# Patient Record
Sex: MALE | Race: BLACK OR AFRICAN AMERICAN | NOT HISPANIC OR LATINO | Employment: OTHER | URBAN - NONMETROPOLITAN AREA
[De-identification: names, ages, dates, MRNs, and addresses within clinical notes are randomized per-mention and may not be internally consistent; named-entity substitution may affect disease eponyms.]

---

## 2019-08-20 RX ORDER — ATORVASTATIN CALCIUM 40 MG/1
40 TABLET, FILM COATED ORAL DAILY
COMMUNITY

## 2019-08-20 RX ORDER — ETODOLAC 400 MG/1
400 TABLET, FILM COATED ORAL 2 TIMES DAILY
COMMUNITY

## 2019-08-20 RX ORDER — ASPIRIN 81 MG/1
81 TABLET, CHEWABLE ORAL DAILY
COMMUNITY

## 2019-08-20 RX ORDER — TAMSULOSIN HYDROCHLORIDE 0.4 MG/1
1 CAPSULE ORAL NIGHTLY
COMMUNITY

## 2019-08-20 RX ORDER — SILDENAFIL 100 MG/1
100 TABLET, FILM COATED ORAL DAILY PRN
COMMUNITY

## 2019-08-20 RX ORDER — OMEPRAZOLE 20 MG/1
20 CAPSULE, DELAYED RELEASE ORAL DAILY
COMMUNITY

## 2019-08-20 RX ORDER — PREDNISONE 20 MG/1
20 TABLET ORAL DAILY
Status: ON HOLD | COMMUNITY
End: 2019-09-11

## 2019-08-20 RX ORDER — TESTOSTERONE 10 MG/.5G
GEL, METERED TOPICAL
COMMUNITY

## 2019-08-22 ENCOUNTER — OFFICE VISIT (OUTPATIENT)
Dept: GASTROENTEROLOGY | Facility: CLINIC | Age: 74
End: 2019-08-22

## 2019-08-22 VITALS
BODY MASS INDEX: 29.41 KG/M2 | DIASTOLIC BLOOD PRESSURE: 70 MMHG | HEART RATE: 57 BPM | OXYGEN SATURATION: 96 % | SYSTOLIC BLOOD PRESSURE: 130 MMHG | TEMPERATURE: 98 F | HEIGHT: 66 IN | WEIGHT: 183 LBS

## 2019-08-22 DIAGNOSIS — K21.9 GASTROESOPHAGEAL REFLUX DISEASE, ESOPHAGITIS PRESENCE NOT SPECIFIED: ICD-10-CM

## 2019-08-22 DIAGNOSIS — Z12.11 ENCOUNTER FOR SCREENING FOR MALIGNANT NEOPLASM OF COLON: Primary | ICD-10-CM

## 2019-08-22 PROCEDURE — 99203 OFFICE O/P NEW LOW 30 MIN: CPT | Performed by: NURSE PRACTITIONER

## 2019-08-22 RX ORDER — LATANOPROST 50 UG/ML
1 SOLUTION/ DROPS OPHTHALMIC NIGHTLY
COMMUNITY

## 2019-08-22 RX ORDER — CHLORAL HYDRATE 500 MG
CAPSULE ORAL
COMMUNITY

## 2019-08-23 PROBLEM — Z12.11 ENCOUNTER FOR SCREENING FOR MALIGNANT NEOPLASM OF COLON: Status: ACTIVE | Noted: 2019-08-23

## 2019-08-23 PROBLEM — K21.9 GASTROESOPHAGEAL REFLUX DISEASE: Status: ACTIVE | Noted: 2019-08-23

## 2019-09-11 ENCOUNTER — ANESTHESIA (OUTPATIENT)
Dept: GASTROENTEROLOGY | Facility: HOSPITAL | Age: 74
End: 2019-09-11

## 2019-09-11 ENCOUNTER — HOSPITAL ENCOUNTER (OUTPATIENT)
Facility: HOSPITAL | Age: 74
Setting detail: HOSPITAL OUTPATIENT SURGERY
Discharge: HOME | End: 2019-09-11
Attending: INTERNAL MEDICINE | Admitting: INTERNAL MEDICINE

## 2019-09-11 ENCOUNTER — ANESTHESIA EVENT (OUTPATIENT)
Dept: GASTROENTEROLOGY | Facility: HOSPITAL | Age: 74
End: 2019-09-11

## 2019-09-11 VITALS
OXYGEN SATURATION: 95 % | DIASTOLIC BLOOD PRESSURE: 55 MMHG | WEIGHT: 168 LBS | RESPIRATION RATE: 17 BRPM | HEART RATE: 60 BPM | SYSTOLIC BLOOD PRESSURE: 90 MMHG | BODY MASS INDEX: 27.99 KG/M2 | TEMPERATURE: 98 F | HEIGHT: 65 IN

## 2019-09-11 DIAGNOSIS — K21.9 GASTROESOPHAGEAL REFLUX DISEASE, ESOPHAGITIS PRESENCE NOT SPECIFIED: ICD-10-CM

## 2019-09-11 PROCEDURE — 43239 EGD BIOPSY SINGLE/MULTIPLE: CPT | Performed by: INTERNAL MEDICINE

## 2019-09-11 PROCEDURE — 87081 CULTURE SCREEN ONLY: CPT | Performed by: INTERNAL MEDICINE

## 2019-09-11 PROCEDURE — 25010000002 PROPOFOL 10 MG/ML EMULSION: Performed by: NURSE ANESTHETIST, CERTIFIED REGISTERED

## 2019-09-11 RX ORDER — SODIUM CHLORIDE 0.9 % (FLUSH) 0.9 %
3-10 SYRINGE (ML) INJECTION AS NEEDED
Status: CANCELLED | OUTPATIENT
Start: 2019-09-11

## 2019-09-11 RX ORDER — SODIUM CHLORIDE 0.9 % (FLUSH) 0.9 %
3 SYRINGE (ML) INJECTION EVERY 12 HOURS SCHEDULED
Status: CANCELLED | OUTPATIENT
Start: 2019-09-11

## 2019-09-11 RX ORDER — SODIUM CHLORIDE 0.9 % (FLUSH) 0.9 %
10 SYRINGE (ML) INJECTION AS NEEDED
Status: DISCONTINUED | OUTPATIENT
Start: 2019-09-11 | End: 2019-09-11 | Stop reason: HOSPADM

## 2019-09-11 RX ORDER — PROPOFOL 10 MG/ML
VIAL (ML) INTRAVENOUS AS NEEDED
Status: DISCONTINUED | OUTPATIENT
Start: 2019-09-11 | End: 2019-09-11 | Stop reason: SURG

## 2019-09-11 RX ORDER — SODIUM CHLORIDE 9 MG/ML
500 INJECTION, SOLUTION INTRAVENOUS CONTINUOUS PRN
Status: DISCONTINUED | OUTPATIENT
Start: 2019-09-11 | End: 2019-09-11 | Stop reason: HOSPADM

## 2019-09-11 RX ORDER — SODIUM CHLORIDE 9 MG/ML
100 INJECTION, SOLUTION INTRAVENOUS CONTINUOUS
Status: CANCELLED | OUTPATIENT
Start: 2019-09-11

## 2019-09-11 RX ADMIN — LIDOCAINE HYDROCHLORIDE 100 MG: 20 INJECTION, SOLUTION INTRAVENOUS at 10:38

## 2019-09-11 RX ADMIN — PROPOFOL 100 MG: 10 INJECTION, EMULSION INTRAVENOUS at 10:38

## 2019-09-11 RX ADMIN — SODIUM CHLORIDE 500 ML: 9 INJECTION, SOLUTION INTRAVENOUS at 08:47

## 2019-09-12 LAB — UREASE TISS QL: NEGATIVE

## 2019-09-16 ENCOUNTER — TELEPHONE (OUTPATIENT)
Dept: GASTROENTEROLOGY | Facility: CLINIC | Age: 74
End: 2019-09-16

## 2019-09-25 ENCOUNTER — HOSPITAL ENCOUNTER (OUTPATIENT)
Facility: HOSPITAL | Age: 74
Setting detail: HOSPITAL OUTPATIENT SURGERY
Discharge: HOME | End: 2019-09-25
Attending: INTERNAL MEDICINE | Admitting: INTERNAL MEDICINE

## 2019-09-25 ENCOUNTER — ANESTHESIA EVENT (OUTPATIENT)
Dept: GASTROENTEROLOGY | Facility: HOSPITAL | Age: 74
End: 2019-09-25

## 2019-09-25 ENCOUNTER — ANESTHESIA (OUTPATIENT)
Dept: GASTROENTEROLOGY | Facility: HOSPITAL | Age: 74
End: 2019-09-25

## 2019-09-25 VITALS
OXYGEN SATURATION: 18 % | SYSTOLIC BLOOD PRESSURE: 81 MMHG | HEIGHT: 65 IN | WEIGHT: 170 LBS | BODY MASS INDEX: 28.32 KG/M2 | TEMPERATURE: 97.6 F | HEART RATE: 55 BPM | DIASTOLIC BLOOD PRESSURE: 52 MMHG | RESPIRATION RATE: 20 BRPM

## 2019-09-25 DIAGNOSIS — Z12.11 ENCOUNTER FOR SCREENING FOR MALIGNANT NEOPLASM OF COLON: ICD-10-CM

## 2019-09-25 PROCEDURE — 45385 COLONOSCOPY W/LESION REMOVAL: CPT | Performed by: INTERNAL MEDICINE

## 2019-09-25 PROCEDURE — 25010000002 PROPOFOL 10 MG/ML EMULSION: Performed by: NURSE ANESTHETIST, CERTIFIED REGISTERED

## 2019-09-25 RX ORDER — PROPOFOL 10 MG/ML
VIAL (ML) INTRAVENOUS AS NEEDED
Status: DISCONTINUED | OUTPATIENT
Start: 2019-09-25 | End: 2019-09-25 | Stop reason: SURG

## 2019-09-25 RX ORDER — SODIUM CHLORIDE 0.9 % (FLUSH) 0.9 %
10 SYRINGE (ML) INJECTION AS NEEDED
Status: DISCONTINUED | OUTPATIENT
Start: 2019-09-25 | End: 2019-09-25 | Stop reason: HOSPADM

## 2019-09-25 RX ORDER — SODIUM CHLORIDE 9 MG/ML
500 INJECTION, SOLUTION INTRAVENOUS CONTINUOUS PRN
Status: DISCONTINUED | OUTPATIENT
Start: 2019-09-25 | End: 2019-09-25 | Stop reason: HOSPADM

## 2019-09-25 RX ADMIN — PROPOFOL 220 MG: 10 INJECTION, EMULSION INTRAVENOUS at 10:12

## 2019-09-25 RX ADMIN — SODIUM CHLORIDE 500 ML: 9 INJECTION, SOLUTION INTRAVENOUS at 09:40

## 2019-09-25 RX ADMIN — LIDOCAINE HYDROCHLORIDE 100 MG: 20 INJECTION, SOLUTION INTRAVENOUS at 10:12

## 2019-09-26 ENCOUNTER — TELEPHONE (OUTPATIENT)
Dept: GASTROENTEROLOGY | Facility: CLINIC | Age: 74
End: 2019-09-26

## 2023-02-24 ENCOUNTER — HOSPITAL ENCOUNTER (OUTPATIENT)
Dept: PREADMISSION TESTING | Age: 78
Discharge: HOME | End: 2023-02-28
Payer: OTHER GOVERNMENT

## 2023-02-24 VITALS — HEIGHT: 66 IN | BODY MASS INDEX: 28.45 KG/M2 | WEIGHT: 177 LBS

## 2023-02-24 LAB
ABO/RH: NORMAL
ANION GAP SERPL CALCULATED.3IONS-SCNC: 10 MMOL/L (ref 7–19)
ANTIBODY SCREEN: NORMAL
APTT: 36.1 SEC (ref 26–36.2)
BASOPHILS ABSOLUTE: 0.1 K/UL (ref 0–0.2)
BASOPHILS RELATIVE PERCENT: 0.9 % (ref 0–1)
BUN BLDV-MCNC: 15 MG/DL (ref 8–23)
CALCIUM SERPL-MCNC: 8.7 MG/DL (ref 8.8–10.2)
CHLORIDE BLD-SCNC: 103 MMOL/L (ref 98–111)
CO2: 24 MMOL/L (ref 22–29)
CREAT SERPL-MCNC: 0.9 MG/DL (ref 0.5–1.2)
EOSINOPHILS ABSOLUTE: 0.3 K/UL (ref 0–0.6)
EOSINOPHILS RELATIVE PERCENT: 4 % (ref 0–5)
GFR SERPL CREATININE-BSD FRML MDRD: >60 ML/MIN/{1.73_M2}
GLUCOSE BLD-MCNC: 126 MG/DL (ref 74–109)
HCT VFR BLD CALC: 39.5 % (ref 42–52)
HEMOGLOBIN: 13.6 G/DL (ref 14–18)
IMMATURE GRANULOCYTES #: 0 K/UL
INR BLD: 1.15 (ref 0.88–1.18)
LYMPHOCYTES ABSOLUTE: 2.7 K/UL (ref 1.1–4.5)
LYMPHOCYTES RELATIVE PERCENT: 42.2 % (ref 20–40)
MCH RBC QN AUTO: 31.3 PG (ref 27–31)
MCHC RBC AUTO-ENTMCNC: 34.4 G/DL (ref 33–37)
MCV RBC AUTO: 90.8 FL (ref 80–94)
MONOCYTES ABSOLUTE: 0.5 K/UL (ref 0–0.9)
MONOCYTES RELATIVE PERCENT: 7.8 % (ref 0–10)
MRSA SCREEN RT-PCR: NOT DETECTED
NEUTROPHILS ABSOLUTE: 2.8 K/UL (ref 1.5–7.5)
NEUTROPHILS RELATIVE PERCENT: 44.8 % (ref 50–65)
PDW BLD-RTO: 14.3 % (ref 11.5–14.5)
PLATELET # BLD: 290 K/UL (ref 130–400)
PMV BLD AUTO: 10.9 FL (ref 9.4–12.4)
POTASSIUM SERPL-SCNC: 3.9 MMOL/L (ref 3.5–5)
PROTHROMBIN TIME: 14.7 SEC (ref 12–14.6)
RBC # BLD: 4.35 M/UL (ref 4.7–6.1)
SODIUM BLD-SCNC: 137 MMOL/L (ref 136–145)
WBC # BLD: 6.3 K/UL (ref 4.8–10.8)

## 2023-02-24 PROCEDURE — 85025 COMPLETE CBC W/AUTO DIFF WBC: CPT

## 2023-02-24 PROCEDURE — 86901 BLOOD TYPING SEROLOGIC RH(D): CPT

## 2023-02-24 PROCEDURE — 80048 BASIC METABOLIC PNL TOTAL CA: CPT

## 2023-02-24 PROCEDURE — 85730 THROMBOPLASTIN TIME PARTIAL: CPT

## 2023-02-24 PROCEDURE — 87641 MR-STAPH DNA AMP PROBE: CPT

## 2023-02-24 PROCEDURE — 86900 BLOOD TYPING SEROLOGIC ABO: CPT

## 2023-02-24 PROCEDURE — 86850 RBC ANTIBODY SCREEN: CPT

## 2023-02-24 PROCEDURE — 85610 PROTHROMBIN TIME: CPT

## 2023-02-24 PROCEDURE — 93005 ELECTROCARDIOGRAM TRACING: CPT | Performed by: ORTHOPAEDIC SURGERY

## 2023-02-24 RX ORDER — MELOXICAM 7.5 MG/1
7.5 TABLET ORAL DAILY
COMMUNITY

## 2023-02-24 RX ORDER — M-VIT,TX,IRON,MINS/CALC/FOLIC 27MG-0.4MG
1 TABLET ORAL DAILY
COMMUNITY

## 2023-02-24 RX ORDER — ROPINIROLE 0.5 MG/1
0.5 TABLET, FILM COATED ORAL NIGHTLY
COMMUNITY

## 2023-02-24 RX ORDER — OMEPRAZOLE 20 MG/1
20 CAPSULE, DELAYED RELEASE ORAL DAILY
COMMUNITY

## 2023-02-24 RX ORDER — SILDENAFIL 100 MG/1
50 TABLET, FILM COATED ORAL PRN
COMMUNITY

## 2023-02-24 RX ORDER — BRIMONIDINE TARTRATE 2 MG/ML
1 SOLUTION/ DROPS OPHTHALMIC 2 TIMES DAILY
COMMUNITY

## 2023-02-24 RX ORDER — TESTOSTERONE 16.2 MG/G
1 GEL TRANSDERMAL DAILY
COMMUNITY

## 2023-02-24 RX ORDER — LATANOPROST 50 UG/ML
1 SOLUTION/ DROPS OPHTHALMIC NIGHTLY
COMMUNITY

## 2023-02-24 RX ORDER — ATORVASTATIN CALCIUM 40 MG/1
20 TABLET, FILM COATED ORAL NIGHTLY
COMMUNITY

## 2023-02-24 RX ORDER — TAMSULOSIN HYDROCHLORIDE 0.4 MG/1
0.4 CAPSULE ORAL NIGHTLY
COMMUNITY

## 2023-02-24 RX ORDER — ASPIRIN 81 MG/1
81 TABLET ORAL DAILY
COMMUNITY

## 2023-02-24 RX ORDER — AMOXICILLIN 500 MG
1 CAPSULE ORAL DAILY
COMMUNITY

## 2023-02-24 NOTE — DISCHARGE INSTRUCTIONS
PREOPERATIVE GUIDELINES WHEN RECEIVING ANESTHESIA    Do not eat or drink anything after midnight, the night before your surgery. This is extremely important for your safety. Take a bath (or shower) the night before your surgery and you may brush your teeth the morning of your surgery. You will be scheduled to arrive at the hospital 2 hours before your surgery, or follow your surgeon's instructions. Dress comfortably. Wear loose clothing that will be easy to remove and comfortable for your trip home. You may wear eyeglasses or contacts but bring your cases with you as they must be remove before your surgery. Hearing aids and dentures will need to be removed before your surgery. Do not wear any jewelry, including body jewelry. All jewelry will need to be removed prior to your surgery. Do not wear fingernail polish or make-up. It is best not to bring any valuables with you. If you are to stay in the hospital overnight, bring your robe, slippers and personal toiletries that you may need. POSTOPERATIVE GUIDELINES AFTER RECEIVING ANESTHESIA    If you are to go home after your surgery, you will need a responsible adult to drive you home. You will not be able to take public transportation after your discharge from the Operative Care Unit unless you are accompanied by a        responsible adult. On returning home, be sure to follow your physician's orders regarding diet, activity and medications. Remember, surgery with general anesthesia or sedation may leave you sleepy, very tired and with a decreased appetite for 12 to 24 hours. If you develop any post-surgical complications or problems, call your surgeon or [facility] Emergency Department ([phone]). The day before surgery you will receive a phone call from the surgery nurse to let you know what time to arrive on the day of surgery.  This call will usually be between 2-4 PM. If you do not receive a phone call by 4 PM the day before your surgery please call [phone] and let them know you have not received an arrival time. If your surgery is on Monday, your call will be on the Friday before your Monday surgery. MEDICATION INSTRUCTIONS PRIOR TO YOUR SURGERY      The morning of surgery: You can take all your usual prescribed medications with a small sip of water. DO NOT TAKE ANY DIABETIC MEDICATIONS the morning of your surgery. DO NOT TAKE ANY SUPPLEMENTS or over the counter medications the morning of  surgery. Leanne Casanova for the [facility]    A script for Bactroban ointment has been call to your pharmacy or was given to you in written form by your surgeon. The guidelines for the ointment use are as follows:    1)  Start using the ointment 7 days before your surgery date    2)  Use the ointment two times a day - morning and night    3)  Place the ointment on a Q-tip and swirl up in your nose making sure you cover completely       the skin just inside of each nostril. Use one end of the Q-tip for each nostril. Chlorhexidine Gluconate 4% Solution    Patient should shower with this soap a minimum of 3 consecutive showers (2 nights before surgery, the night before surgery and the morning of surgery) washing from the neck down (avoiding contact with genitalia). DO NOT [address] YOUR HAIR OR FACE WITH THIS SOAP. When washing with this soap, apply enough to suds up the body thoroughly, turn the water away from your body and allow the soap suds to remain on the body for 2 full minutes, then rinse body completely. After using this soap on the body, please do not apply powders or lotions to your body. After the shower the night before surgery, please dry off with a clean towel, sleep in freshly laundered pj's, and change your bed linen before going to sleep.           You are scheduled to return to the [facility] on Tuesday 3/7/23 at 1pm  for your COVID test.  Enter at the main level of the [facility], where the granite ball is floating on water, go past the ball and when you come to the information board on the wall across from the elevators, you will see a door with a keypad. The sign next to it will read \"Staff Only\" and will have a red Ute on the sign. You will knock on that door and we will come get you for your test.  We will be expecting you. You must have your COVID test on this day or surgery will be canceled. After having your COVID 19 test, you will need to self isolate until the day of surgery. This means no public gatherings such as Yazidism attendance, weddings, showers, funerals, etc.  If you need something from the store, you will need someone to pick it up for you. It is very important that you are not around other people prior to your surgery or you could contract COVID 19 between the time you are tested and your surgery date. [address] [place] [address] for Surgery Patients-Revised 6-    Visitors for surgery patients are essential for the patient's emotional well-being and care       post operatively. 2.   Visitor Expectations and Limitations        3. One visitor allowed with patients in the preop/postop rooms. 4.  A second visitor may sit in the waiting area. 5.  No children under 13 allowed in the pre-post op areas unless they are the patient. 6.  Two people may be with an underage surgical/procedural patient in preop/postop        room. 7.  If you are admitted to the hospital post operatively, there are NO RESTRICTIONS on       the floor at this time. 8.  If you are admitted to ICU postoperatively, you may have one visitor in the room from        7A-7P. A second visitor may sit in the ICU waiting room. No overnight visitors in         ICU waiting room.

## 2023-02-25 LAB
EKG P AXIS: 43 DEGREES
EKG P-R INTERVAL: 170 MS
EKG Q-T INTERVAL: 480 MS
EKG QRS DURATION: 98 MS
EKG QTC CALCULATION (BAZETT): 472 MS
EKG T AXIS: -48 DEGREES

## 2023-02-25 PROCEDURE — 93010 ELECTROCARDIOGRAM REPORT: CPT | Performed by: INTERNAL MEDICINE

## 2023-03-09 ENCOUNTER — APPOINTMENT (OUTPATIENT)
Dept: GENERAL RADIOLOGY | Age: 78
DRG: 467 | End: 2023-03-09
Attending: ORTHOPAEDIC SURGERY
Payer: OTHER GOVERNMENT

## 2023-03-09 ENCOUNTER — ANESTHESIA (OUTPATIENT)
Dept: OPERATING ROOM | Age: 78
End: 2023-03-09
Payer: OTHER GOVERNMENT

## 2023-03-09 ENCOUNTER — ANESTHESIA EVENT (OUTPATIENT)
Dept: OPERATING ROOM | Age: 78
End: 2023-03-09
Payer: OTHER GOVERNMENT

## 2023-03-09 ENCOUNTER — HOSPITAL ENCOUNTER (INPATIENT)
Age: 78
LOS: 5 days | Discharge: NURSING, REHAB OR LONG-TERM CARE | DRG: 467 | End: 2023-03-14
Attending: ORTHOPAEDIC SURGERY | Admitting: ORTHOPAEDIC SURGERY
Payer: OTHER GOVERNMENT

## 2023-03-09 DIAGNOSIS — T84.038A LOOSENING OF PROSTHETIC HIP, INITIAL ENCOUNTER (HCC): ICD-10-CM

## 2023-03-09 DIAGNOSIS — Z96.649 LOOSENING OF PROSTHETIC HIP, INITIAL ENCOUNTER (HCC): ICD-10-CM

## 2023-03-09 DIAGNOSIS — T84.030A FEMORAL LOOSENING OF PROSTHETIC RIGHT HIP, INITIAL ENCOUNTER (HCC): Primary | ICD-10-CM

## 2023-03-09 LAB
ABO/RH: NORMAL
ANTIBODY SCREEN: NORMAL
KOH PREP: NORMAL

## 2023-03-09 PROCEDURE — A4217 STERILE WATER/SALINE, 500 ML: HCPCS | Performed by: ORTHOPAEDIC SURGERY

## 2023-03-09 PROCEDURE — 7100000001 HC PACU RECOVERY - ADDTL 15 MIN: Performed by: ORTHOPAEDIC SURGERY

## 2023-03-09 PROCEDURE — 87070 CULTURE OTHR SPECIMN AEROBIC: CPT

## 2023-03-09 PROCEDURE — 87206 SMEAR FLUORESCENT/ACID STAI: CPT

## 2023-03-09 PROCEDURE — 3700000000 HC ANESTHESIA ATTENDED CARE: Performed by: ORTHOPAEDIC SURGERY

## 2023-03-09 PROCEDURE — C1713 ANCHOR/SCREW BN/BN,TIS/BN: HCPCS | Performed by: ORTHOPAEDIC SURGERY

## 2023-03-09 PROCEDURE — 87176 TISSUE HOMOGENIZATION CULTR: CPT

## 2023-03-09 PROCEDURE — 87205 SMEAR GRAM STAIN: CPT

## 2023-03-09 PROCEDURE — 1210000000 HC MED SURG R&B

## 2023-03-09 PROCEDURE — 6360000002 HC RX W HCPCS

## 2023-03-09 PROCEDURE — 36415 COLL VENOUS BLD VENIPUNCTURE: CPT

## 2023-03-09 PROCEDURE — 6370000000 HC RX 637 (ALT 250 FOR IP): Performed by: ORTHOPAEDIC SURGERY

## 2023-03-09 PROCEDURE — 0SR90J9 REPLACEMENT OF RIGHT HIP JOINT WITH SYNTHETIC SUBSTITUTE, CEMENTED, OPEN APPROACH: ICD-10-PCS | Performed by: ORTHOPAEDIC SURGERY

## 2023-03-09 PROCEDURE — 2580000003 HC RX 258: Performed by: ORTHOPAEDIC SURGERY

## 2023-03-09 PROCEDURE — 2500000003 HC RX 250 WO HCPCS: Performed by: ORTHOPAEDIC SURGERY

## 2023-03-09 PROCEDURE — 2500000003 HC RX 250 WO HCPCS

## 2023-03-09 PROCEDURE — 73502 X-RAY EXAM HIP UNI 2-3 VIEWS: CPT

## 2023-03-09 PROCEDURE — 86901 BLOOD TYPING SEROLOGIC RH(D): CPT

## 2023-03-09 PROCEDURE — 87102 FUNGUS ISOLATION CULTURE: CPT

## 2023-03-09 PROCEDURE — 7100000000 HC PACU RECOVERY - FIRST 15 MIN: Performed by: ORTHOPAEDIC SURGERY

## 2023-03-09 PROCEDURE — 2780000010 HC IMPLANT OTHER: Performed by: ORTHOPAEDIC SURGERY

## 2023-03-09 PROCEDURE — 2709999900 HC NON-CHARGEABLE SUPPLY: Performed by: ORTHOPAEDIC SURGERY

## 2023-03-09 PROCEDURE — 2580000003 HC RX 258: Performed by: ANESTHESIOLOGY

## 2023-03-09 PROCEDURE — 3600000005 HC SURGERY LEVEL 5 BASE: Performed by: ORTHOPAEDIC SURGERY

## 2023-03-09 PROCEDURE — C1769 GUIDE WIRE: HCPCS | Performed by: ORTHOPAEDIC SURGERY

## 2023-03-09 PROCEDURE — 3700000001 HC ADD 15 MINUTES (ANESTHESIA): Performed by: ORTHOPAEDIC SURGERY

## 2023-03-09 PROCEDURE — 6360000002 HC RX W HCPCS: Performed by: ORTHOPAEDIC SURGERY

## 2023-03-09 PROCEDURE — 3600000015 HC SURGERY LEVEL 5 ADDTL 15MIN: Performed by: ORTHOPAEDIC SURGERY

## 2023-03-09 PROCEDURE — 87075 CULTR BACTERIA EXCEPT BLOOD: CPT

## 2023-03-09 PROCEDURE — 3209999900 FLUORO FOR SURGICAL PROCEDURES

## 2023-03-09 PROCEDURE — 0SP90JZ REMOVAL OF SYNTHETIC SUBSTITUTE FROM RIGHT HIP JOINT, OPEN APPROACH: ICD-10-PCS | Performed by: ORTHOPAEDIC SURGERY

## 2023-03-09 PROCEDURE — 86900 BLOOD TYPING SEROLOGIC ABO: CPT

## 2023-03-09 PROCEDURE — 86850 RBC ANTIBODY SCREEN: CPT

## 2023-03-09 PROCEDURE — C1776 JOINT DEVICE (IMPLANTABLE): HCPCS | Performed by: ORTHOPAEDIC SURGERY

## 2023-03-09 PROCEDURE — 87116 MYCOBACTERIA CULTURE: CPT

## 2023-03-09 DEVICE — WAGNER SL REVISION® HIP STEM, UNCEMENTED, Ø 16/265, TAPER 12/14
Type: IMPLANTABLE DEVICE | Site: HIP | Status: FUNCTIONAL
Brand: WAGNER SL REVISION®

## 2023-03-09 DEVICE — OR3O DUAL MOBILITY XLPE INSERT 28/42
Type: IMPLANTABLE DEVICE | Site: HIP | Status: FUNCTIONAL
Brand: OR3O DUAL MOBILITY

## 2023-03-09 DEVICE — OR3O DUAL MOBILITY LINER 42/54
Type: IMPLANTABLE DEVICE | Site: HIP | Status: FUNCTIONAL
Brand: OR3O DUAL MOBILITY

## 2023-03-09 RX ORDER — ATORVASTATIN CALCIUM 20 MG/1
20 TABLET, FILM COATED ORAL NIGHTLY
Status: DISCONTINUED | OUTPATIENT
Start: 2023-03-09 | End: 2023-03-14 | Stop reason: HOSPADM

## 2023-03-09 RX ORDER — AMOXICILLIN 500 MG
1 CAPSULE ORAL DAILY
Status: DISCONTINUED | OUTPATIENT
Start: 2023-03-10 | End: 2023-03-09

## 2023-03-09 RX ORDER — BRIMONIDINE TARTRATE 2 MG/ML
1 SOLUTION/ DROPS OPHTHALMIC 2 TIMES DAILY
Status: DISCONTINUED | OUTPATIENT
Start: 2023-03-09 | End: 2023-03-14 | Stop reason: HOSPADM

## 2023-03-09 RX ORDER — DOCUSATE SODIUM 100 MG/1
100 CAPSULE, LIQUID FILLED ORAL 2 TIMES DAILY
Status: DISCONTINUED | OUTPATIENT
Start: 2023-03-09 | End: 2023-03-14 | Stop reason: HOSPADM

## 2023-03-09 RX ORDER — SODIUM CHLORIDE 9 MG/ML
INJECTION, SOLUTION INTRAVENOUS PRN
Status: DISCONTINUED | OUTPATIENT
Start: 2023-03-09 | End: 2023-03-09 | Stop reason: HOSPADM

## 2023-03-09 RX ORDER — CELECOXIB 100 MG/1
100 CAPSULE ORAL ONCE
Status: COMPLETED | OUTPATIENT
Start: 2023-03-09 | End: 2023-03-09

## 2023-03-09 RX ORDER — HYDROMORPHONE HYDROCHLORIDE 1 MG/ML
0.25 INJECTION, SOLUTION INTRAMUSCULAR; INTRAVENOUS; SUBCUTANEOUS EVERY 5 MIN PRN
Status: DISCONTINUED | OUTPATIENT
Start: 2023-03-09 | End: 2023-03-09 | Stop reason: HOSPADM

## 2023-03-09 RX ORDER — ACETAMINOPHEN 325 MG/1
650 TABLET ORAL EVERY 6 HOURS
Status: DISCONTINUED | OUTPATIENT
Start: 2023-03-09 | End: 2023-03-14 | Stop reason: HOSPADM

## 2023-03-09 RX ORDER — GLYCOPYRROLATE 0.2 MG/ML
INJECTION INTRAMUSCULAR; INTRAVENOUS PRN
Status: DISCONTINUED | OUTPATIENT
Start: 2023-03-09 | End: 2023-03-09 | Stop reason: SDUPTHER

## 2023-03-09 RX ORDER — OXYCODONE HYDROCHLORIDE 5 MG/1
10 TABLET ORAL EVERY 4 HOURS PRN
Status: DISCONTINUED | OUTPATIENT
Start: 2023-03-09 | End: 2023-03-14 | Stop reason: HOSPADM

## 2023-03-09 RX ORDER — MORPHINE SULFATE 4 MG/ML
4 INJECTION, SOLUTION INTRAMUSCULAR; INTRAVENOUS
Status: DISCONTINUED | OUTPATIENT
Start: 2023-03-09 | End: 2023-03-14 | Stop reason: HOSPADM

## 2023-03-09 RX ORDER — HYDROMORPHONE HYDROCHLORIDE 1 MG/ML
0.5 INJECTION, SOLUTION INTRAMUSCULAR; INTRAVENOUS; SUBCUTANEOUS EVERY 5 MIN PRN
Status: DISCONTINUED | OUTPATIENT
Start: 2023-03-09 | End: 2023-03-09 | Stop reason: HOSPADM

## 2023-03-09 RX ORDER — OXYCODONE HYDROCHLORIDE 5 MG/1
5 TABLET ORAL EVERY 4 HOURS PRN
Status: DISCONTINUED | OUTPATIENT
Start: 2023-03-09 | End: 2023-03-14 | Stop reason: HOSPADM

## 2023-03-09 RX ORDER — PANTOPRAZOLE SODIUM 40 MG/1
40 TABLET, DELAYED RELEASE ORAL
Status: DISCONTINUED | OUTPATIENT
Start: 2023-03-10 | End: 2023-03-14 | Stop reason: HOSPADM

## 2023-03-09 RX ORDER — M-VIT,TX,IRON,MINS/CALC/FOLIC 27MG-0.4MG
1 TABLET ORAL DAILY
Status: DISCONTINUED | OUTPATIENT
Start: 2023-03-10 | End: 2023-03-14 | Stop reason: HOSPADM

## 2023-03-09 RX ORDER — SODIUM CHLORIDE 0.9 % (FLUSH) 0.9 %
5-40 SYRINGE (ML) INJECTION EVERY 12 HOURS SCHEDULED
Status: DISCONTINUED | OUTPATIENT
Start: 2023-03-09 | End: 2023-03-09 | Stop reason: HOSPADM

## 2023-03-09 RX ORDER — LATANOPROST 50 UG/ML
1 SOLUTION/ DROPS OPHTHALMIC NIGHTLY
Status: DISCONTINUED | OUTPATIENT
Start: 2023-03-09 | End: 2023-03-14 | Stop reason: HOSPADM

## 2023-03-09 RX ORDER — ROPINIROLE 0.5 MG/1
0.5 TABLET, FILM COATED ORAL NIGHTLY
Status: DISCONTINUED | OUTPATIENT
Start: 2023-03-09 | End: 2023-03-14 | Stop reason: HOSPADM

## 2023-03-09 RX ORDER — TESTOSTERONE 16.2 MG/G
1 GEL TRANSDERMAL DAILY
Status: DISCONTINUED | OUTPATIENT
Start: 2023-03-10 | End: 2023-03-14 | Stop reason: HOSPADM

## 2023-03-09 RX ORDER — MELOXICAM 7.5 MG/1
7.5 TABLET ORAL DAILY
Status: DISCONTINUED | OUTPATIENT
Start: 2023-03-10 | End: 2023-03-14 | Stop reason: HOSPADM

## 2023-03-09 RX ORDER — PROPOFOL 10 MG/ML
INJECTION, EMULSION INTRAVENOUS PRN
Status: DISCONTINUED | OUTPATIENT
Start: 2023-03-09 | End: 2023-03-09 | Stop reason: SDUPTHER

## 2023-03-09 RX ORDER — SODIUM CHLORIDE 0.9 % (FLUSH) 0.9 %
5-40 SYRINGE (ML) INJECTION PRN
Status: DISCONTINUED | OUTPATIENT
Start: 2023-03-09 | End: 2023-03-14 | Stop reason: HOSPADM

## 2023-03-09 RX ORDER — DEXAMETHASONE SODIUM PHOSPHATE 10 MG/ML
8 INJECTION, SOLUTION INTRAMUSCULAR; INTRAVENOUS ONCE
Status: DISCONTINUED | OUTPATIENT
Start: 2023-03-09 | End: 2023-03-09 | Stop reason: HOSPADM

## 2023-03-09 RX ORDER — SODIUM CHLORIDE 0.9 % (FLUSH) 0.9 %
5-40 SYRINGE (ML) INJECTION PRN
Status: DISCONTINUED | OUTPATIENT
Start: 2023-03-09 | End: 2023-03-09 | Stop reason: HOSPADM

## 2023-03-09 RX ORDER — MORPHINE SULFATE 2 MG/ML
2 INJECTION, SOLUTION INTRAMUSCULAR; INTRAVENOUS
Status: DISCONTINUED | OUTPATIENT
Start: 2023-03-09 | End: 2023-03-14 | Stop reason: HOSPADM

## 2023-03-09 RX ORDER — ACETAMINOPHEN 500 MG
1000 TABLET ORAL ONCE
Status: COMPLETED | OUTPATIENT
Start: 2023-03-09 | End: 2023-03-09

## 2023-03-09 RX ORDER — ROCURONIUM BROMIDE 10 MG/ML
INJECTION, SOLUTION INTRAVENOUS PRN
Status: DISCONTINUED | OUTPATIENT
Start: 2023-03-09 | End: 2023-03-09 | Stop reason: SDUPTHER

## 2023-03-09 RX ORDER — SILDENAFIL 100 MG/1
50 TABLET, FILM COATED ORAL PRN
Status: DISCONTINUED | OUTPATIENT
Start: 2023-03-09 | End: 2023-03-09

## 2023-03-09 RX ORDER — ONDANSETRON 2 MG/ML
4 INJECTION INTRAMUSCULAR; INTRAVENOUS
Status: DISCONTINUED | OUTPATIENT
Start: 2023-03-09 | End: 2023-03-09 | Stop reason: HOSPADM

## 2023-03-09 RX ORDER — DOCUSATE SODIUM 100 MG/1
100 CAPSULE, LIQUID FILLED ORAL 2 TIMES DAILY
COMMUNITY

## 2023-03-09 RX ORDER — TRANEXAMIC ACID 650 MG/1
1950 TABLET ORAL
Status: COMPLETED | OUTPATIENT
Start: 2023-03-09 | End: 2023-03-09

## 2023-03-09 RX ORDER — ROPIVACAINE HYDROCHLORIDE 2 MG/ML
INJECTION, SOLUTION EPIDURAL; INFILTRATION; PERINEURAL PRN
Status: DISCONTINUED | OUTPATIENT
Start: 2023-03-09 | End: 2023-03-09 | Stop reason: HOSPADM

## 2023-03-09 RX ORDER — SODIUM CHLORIDE, SODIUM LACTATE, POTASSIUM CHLORIDE, CALCIUM CHLORIDE 600; 310; 30; 20 MG/100ML; MG/100ML; MG/100ML; MG/100ML
INJECTION, SOLUTION INTRAVENOUS CONTINUOUS
Status: DISCONTINUED | OUTPATIENT
Start: 2023-03-09 | End: 2023-03-09 | Stop reason: HOSPADM

## 2023-03-09 RX ORDER — EPHEDRINE SULFATE 50 MG/ML
INJECTION, SOLUTION INTRAVENOUS PRN
Status: DISCONTINUED | OUTPATIENT
Start: 2023-03-09 | End: 2023-03-09 | Stop reason: SDUPTHER

## 2023-03-09 RX ORDER — TAMSULOSIN HYDROCHLORIDE 0.4 MG/1
0.4 CAPSULE ORAL NIGHTLY
Status: DISCONTINUED | OUTPATIENT
Start: 2023-03-09 | End: 2023-03-14 | Stop reason: HOSPADM

## 2023-03-09 RX ORDER — LIDOCAINE HYDROCHLORIDE 10 MG/ML
INJECTION, SOLUTION EPIDURAL; INFILTRATION; INTRACAUDAL; PERINEURAL PRN
Status: DISCONTINUED | OUTPATIENT
Start: 2023-03-09 | End: 2023-03-09 | Stop reason: SDUPTHER

## 2023-03-09 RX ORDER — SODIUM CHLORIDE 0.9 % (FLUSH) 0.9 %
5-40 SYRINGE (ML) INJECTION EVERY 12 HOURS SCHEDULED
Status: DISCONTINUED | OUTPATIENT
Start: 2023-03-09 | End: 2023-03-14 | Stop reason: HOSPADM

## 2023-03-09 RX ORDER — FENTANYL CITRATE 50 UG/ML
INJECTION, SOLUTION INTRAMUSCULAR; INTRAVENOUS PRN
Status: DISCONTINUED | OUTPATIENT
Start: 2023-03-09 | End: 2023-03-09 | Stop reason: SDUPTHER

## 2023-03-09 RX ORDER — SODIUM CHLORIDE 9 MG/ML
INJECTION, SOLUTION INTRAVENOUS PRN
Status: DISCONTINUED | OUTPATIENT
Start: 2023-03-09 | End: 2023-03-14 | Stop reason: HOSPADM

## 2023-03-09 RX ORDER — DEXAMETHASONE SODIUM PHOSPHATE 10 MG/ML
INJECTION, SOLUTION INTRAMUSCULAR; INTRAVENOUS PRN
Status: DISCONTINUED | OUTPATIENT
Start: 2023-03-09 | End: 2023-03-09 | Stop reason: SDUPTHER

## 2023-03-09 RX ORDER — MELOXICAM 7.5 MG/1
3.75 TABLET ORAL DAILY
Status: DISCONTINUED | OUTPATIENT
Start: 2023-03-10 | End: 2023-03-10

## 2023-03-09 RX ORDER — ASPIRIN 81 MG/1
81 TABLET ORAL DAILY
Status: DISCONTINUED | OUTPATIENT
Start: 2023-03-10 | End: 2023-03-13

## 2023-03-09 RX ORDER — OXYCODONE HCL 10 MG/1
10 TABLET, FILM COATED, EXTENDED RELEASE ORAL
Status: COMPLETED | OUTPATIENT
Start: 2023-03-09 | End: 2023-03-09

## 2023-03-09 RX ADMIN — FENTANYL CITRATE 50 MCG: 0.05 INJECTION, SOLUTION INTRAMUSCULAR; INTRAVENOUS at 16:44

## 2023-03-09 RX ADMIN — FENTANYL CITRATE 25 MCG: 0.05 INJECTION, SOLUTION INTRAMUSCULAR; INTRAVENOUS at 19:14

## 2023-03-09 RX ADMIN — PROPOFOL 50 MG: 10 INJECTION, EMULSION INTRAVENOUS at 16:37

## 2023-03-09 RX ADMIN — ROCURONIUM BROMIDE 50 MG: 10 INJECTION, SOLUTION INTRAVENOUS at 16:38

## 2023-03-09 RX ADMIN — FENTANYL CITRATE 25 MCG: 0.05 INJECTION, SOLUTION INTRAMUSCULAR; INTRAVENOUS at 19:02

## 2023-03-09 RX ADMIN — CEFAZOLIN 2000 MG: 2 INJECTION, POWDER, FOR SOLUTION INTRAMUSCULAR; INTRAVENOUS at 17:02

## 2023-03-09 RX ADMIN — SUGAMMADEX 200 MG: 100 INJECTION, SOLUTION INTRAVENOUS at 19:21

## 2023-03-09 RX ADMIN — TRANEXAMIC ACID 1950 MG: 650 TABLET ORAL at 12:19

## 2023-03-09 RX ADMIN — LIDOCAINE HYDROCHLORIDE 5 ML: 10 INJECTION, SOLUTION EPIDURAL; INFILTRATION; INTRACAUDAL; PERINEURAL at 16:35

## 2023-03-09 RX ADMIN — ATORVASTATIN CALCIUM 20 MG: 20 TABLET, FILM COATED ORAL at 21:46

## 2023-03-09 RX ADMIN — ROCURONIUM BROMIDE 20 MG: 10 INJECTION, SOLUTION INTRAVENOUS at 17:22

## 2023-03-09 RX ADMIN — SODIUM CHLORIDE, SODIUM LACTATE, POTASSIUM CHLORIDE, AND CALCIUM CHLORIDE: 600; 310; 30; 20 INJECTION, SOLUTION INTRAVENOUS at 18:34

## 2023-03-09 RX ADMIN — PROPOFOL 50 MG: 10 INJECTION, EMULSION INTRAVENOUS at 16:39

## 2023-03-09 RX ADMIN — ROPINIROLE HYDROCHLORIDE 0.5 MG: 0.5 TABLET, FILM COATED ORAL at 21:46

## 2023-03-09 RX ADMIN — ASPIRIN 325 MG: 325 TABLET, COATED ORAL at 21:46

## 2023-03-09 RX ADMIN — DOCUSATE SODIUM 100 MG: 100 CAPSULE, LIQUID FILLED ORAL at 21:46

## 2023-03-09 RX ADMIN — OXYCODONE HYDROCHLORIDE 10 MG: 10 TABLET, FILM COATED, EXTENDED RELEASE ORAL at 12:19

## 2023-03-09 RX ADMIN — FENTANYL CITRATE 50 MCG: 0.05 INJECTION, SOLUTION INTRAMUSCULAR; INTRAVENOUS at 17:49

## 2023-03-09 RX ADMIN — SODIUM CHLORIDE, SODIUM LACTATE, POTASSIUM CHLORIDE, AND CALCIUM CHLORIDE: 600; 310; 30; 20 INJECTION, SOLUTION INTRAVENOUS at 12:10

## 2023-03-09 RX ADMIN — TAMSULOSIN HYDROCHLORIDE 0.4 MG: 0.4 CAPSULE ORAL at 21:46

## 2023-03-09 RX ADMIN — LATANOPROST 1 DROP: 50 SOLUTION OPHTHALMIC at 21:47

## 2023-03-09 RX ADMIN — PROPOFOL 50 MG: 10 INJECTION, EMULSION INTRAVENOUS at 16:35

## 2023-03-09 RX ADMIN — ACETAMINOPHEN 650 MG: 325 TABLET ORAL at 21:47

## 2023-03-09 RX ADMIN — EPHEDRINE SULFATE 10 MG: 50 INJECTION INTRAMUSCULAR; INTRAVENOUS; SUBCUTANEOUS at 16:59

## 2023-03-09 RX ADMIN — FENTANYL CITRATE 50 MCG: 0.05 INJECTION, SOLUTION INTRAMUSCULAR; INTRAVENOUS at 17:14

## 2023-03-09 RX ADMIN — DEXAMETHASONE SODIUM PHOSPHATE 4 MG: 10 INJECTION, SOLUTION INTRAMUSCULAR; INTRAVENOUS at 16:35

## 2023-03-09 RX ADMIN — CELECOXIB 100 MG: 100 CAPSULE ORAL at 12:19

## 2023-03-09 RX ADMIN — GLYCOPYRROLATE 0.2 MG: 0.2 INJECTION, SOLUTION INTRAMUSCULAR; INTRAVENOUS at 17:01

## 2023-03-09 RX ADMIN — BRIMONIDINE TARTRATE 1 DROP: 2 SOLUTION OPHTHALMIC at 21:47

## 2023-03-09 RX ADMIN — FENTANYL CITRATE 50 MCG: 0.05 INJECTION, SOLUTION INTRAMUSCULAR; INTRAVENOUS at 16:35

## 2023-03-09 RX ADMIN — ACETAMINOPHEN 1000 MG: 500 TABLET ORAL at 12:19

## 2023-03-09 RX ADMIN — PHENYLEPHRINE HYDROCHLORIDE 100 MCG: 10 INJECTION INTRAVENOUS at 17:03

## 2023-03-09 RX ADMIN — OXYCODONE HYDROCHLORIDE 5 MG: 5 TABLET ORAL at 21:47

## 2023-03-09 ASSESSMENT — PAIN SCALES - GENERAL
PAINLEVEL_OUTOF10: 1
PAINLEVEL_OUTOF10: 0
PAINLEVEL_OUTOF10: 5

## 2023-03-09 ASSESSMENT — LIFESTYLE VARIABLES: SMOKING_STATUS: 0

## 2023-03-09 ASSESSMENT — PAIN DESCRIPTION - ORIENTATION: ORIENTATION: RIGHT

## 2023-03-09 ASSESSMENT — PAIN DESCRIPTION - LOCATION
LOCATION: HIP
LOCATION: HIP

## 2023-03-09 ASSESSMENT — PAIN - FUNCTIONAL ASSESSMENT
PAIN_FUNCTIONAL_ASSESSMENT: PREVENTS OR INTERFERES SOME ACTIVE ACTIVITIES AND ADLS
PAIN_FUNCTIONAL_ASSESSMENT: NONE - DENIES PAIN

## 2023-03-09 NOTE — DISCHARGE INSTR - ACTIVITY
Up with walker for short distances   Touch down with minimal weight bearing on operative leg just for balance for 3 weeks.

## 2023-03-09 NOTE — H&P
[facility] Pre-Operative History and Physical    Patient Name: Daryn  : 1945        Chief Complaint: Right hip pain after right hip arthroplasty in 2018    History of Present Illness:   This patient has had ongoing pain for several months that has been unresponsive to conservative care which has included injection, therapy, activity modification and presents now for surgery.  Pain is deep in the groin buttock extending to the knee at times.  Pain is a severe ache that is worse with walking, standing and getting up and down.  Pain is somewhat improved with over the counter medication.  He had a right hip arthroplasty in 2018 and recently saw Dr. Benitez who told him his femoral component is loose and was referred to me.     Past Medical History:       Diagnosis Date    Arthritis     COVID-19     cough    ED (erectile dysfunction)     GERD (gastroesophageal reflux disease)     Hip pain     Hyperlipidemia      Past Surgical History:       Procedure Laterality Date    CARDIAC CATHETERIZATION      \"no blockage, just one vein routed weird\"    HERNIA REPAIR      JOINT REPLACEMENT Right 2018    right hip    KNEE ARTHROSCOPY Right     TENDON TRANSFER UPPER ARM Left        Medications:   Prior to Admission medications    Medication Sig Start Date End Date Taking? Authorizing Provider   docusate sodium (COLACE) 100 MG capsule Take 100 mg by mouth in the morning and at bedtime   Yes Historical Provider, MD   meloxicam (MOBIC) 7.5 MG tablet Take 7.5 mg by mouth daily Indications: Arthritis    Historical Provider, MD   aspirin 81 MG EC tablet Take 81 mg by mouth daily    Historical Provider, MD   Omega-3 Fatty Acids (FISH OIL) 1200 MG CAPS Take 1 capsule by mouth daily    Historical Provider, MD   omeprazole (PRILOSEC) 20 MG delayed release capsule Take 20 mg by mouth daily Indications: Reflux Gastritis    Historical Provider, MD   rOPINIRole (REQUIP) 0.5 MG tablet Take 0.5 mg by mouth nightly  Indications: Restless Leg Syndrome    Historical Provider, MD   Testosterone (ANDROGEL) 20.25 MG/ACT (1.62%) GEL gel Place 1 actuation onto the skin daily. Historical Provider, MD   sildenafil (VIAGRA) 100 MG tablet Take 50 mg by mouth as needed for Erectile Dysfunction    Historical Provider, MD   brimonidine (ALPHAGAN) 0.2 % ophthalmic solution Place 1 drop into both eyes 2 times daily Indications: Glaucoma    Historical Provider, MD   latanoprost (XALATAN) 0.005 % ophthalmic solution Place 1 drop into both eyes nightly Indications: Glaucoma    Historical Provider, MD   atorvastatin (LIPITOR) 40 MG tablet Take 20 mg by mouth nightly Indications: Changes in Cholesterol    Historical Provider, MD   Multiple Vitamins-Minerals (THERAPEUTIC MULTIVITAMIN-MINERALS) tablet Take 1 tablet by mouth daily    Historical Provider, MD   tamsulosin (FLOMAX) 0.4 MG capsule Take 0.4 mg by mouth nightly    Historical Provider, MD       Allergies:  Patient has no known allergies. Social History:   Tobacco:  reports that he has quit smoking. His smoking use included cigars and pipe. He has never used smokeless tobacco.   Alcohol:  reports current alcohol use of about 14.0 standard drinks per week. Review of Systems:  General: Denies any fever or chills  EYES: Denies any diplopia  ENT: Tinnitus or vertigo  Resp: Denies any shortness of breath, cough or wheezing  Cardiac: Denies any chest pain, palpitations, claudication or edema  GI: Denies any melena, hematochezia, hematemesis or pyrosis  : Denies any frequency, urgency, hesitancy or incontinence  Musculoskeletal: Denies back pain,  myalgias. Reports right hip pain.   Heme: Denies bruising or bleeding easily  Endocrine: Denies any history of diabetes or thyroid disease  Psych: Denies anxiety or depression  Neuro: Denies any focal motor or sensory deficits      Physical Exam:  Vitals: Pulse 75   Temp 97.6 °F (36.4 °C) (Tympanic)   Resp 16   Ht 5' 5\" (1.651 m)   Wt 177 lb (80.3 kg)   SpO2 98%   BMI 29.45 kg/m²   CONSTITUTIONAL: Alert, appropriate, no acute distress. PSYCH: mood and affect are normal with a normal rate and tone of speech  EYES: Non icteric, EOM intact, pupils equal round and reactive to light  ENT: Mucus membranes moist, no oral pharyngeal lesions, nares patent   NECK: Supple, no masses, no JVD, trachea mid line   CHEST/LUNGS: CTA bilaterally, normal respiratory effort   CARDIOVASCULAR: RRR, no murmurs,  2+ DP and radial pulses bilaterally  ABDOMEN: soft, nontender  EXTREMITIES: warm, well perfused, no edema. Right hip joint with mildly reduced range of motion and generalized tenderness. He is tender about his greater trochanter. Positive active straight leg raise. Neurovascular exam normal  SKIN: warm, dry with no rashes or lesions  LYMPH: No cervical or inguinal lymphadenopathy    RADIOLOGY: xrays of extremity shows obvious loosening of  cemented femoral component of right total hip arthroplasty    LABORATORY:    CBC :    Lab Results   Component Value Date/Time    WBC 6.3 02/24/2023 09:40 AM    HGB 13.6 02/24/2023 09:40 AM    HCT 39.5 02/24/2023 09:40 AM     02/24/2023 09:40 AM     BMP:   Lab Results   Component Value Date/Time     02/24/2023 09:40 AM    K 3.9 02/24/2023 09:40 AM     02/24/2023 09:40 AM    CO2 24 02/24/2023 09:40 AM    BUN 15 02/24/2023 09:40 AM    CREATININE 0.9 02/24/2023 09:40 AM    CALCIUM 8.7 02/24/2023 09:40 AM    LABGLOM >60 02/24/2023 09:40 AM    GLUCOSE 126 02/24/2023 09:40 AM     PT/INR:    Lab Results   Component Value Date/Time    PROTIME 14.7 02/24/2023 09:40 AM    INR 1.15 02/24/2023 09:40 AM     U/A: No results found for: NITRITE, WBCUA, RBCUA, BACTERIA  HgBA1c:  No components found for: HGBA1C    Assessment:  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 .  Most recent office note reviewed and there has been no change in health status. PLAN: Right Hip revision. I explained to the patient/family the patient's diagnosis and operative procedure in detail. They said they understood basically what was wrong and how I planned to fix it. They understand the expected recovery and the risks which include excessive bleeding, infection, reaction to anesthesia, nerve injury, stiffness, fracture, deformity and dislocation. They then signed an operative consent form. Provider:  Stanton Turner MD  Date: 3/9/2023

## 2023-03-09 NOTE — ANESTHESIA PRE PROCEDURE
Department of Anesthesiology  Preprocedure Note       Name:  Karena Lin   Age:  76 y.o.  :  1945                                          MRN:  [id number]         Date:  3/9/2023      Surgeon: Tracy Carlson):  Buckley Brunner, MD    Procedure: Procedure(s):  RIGHT HIP TOTAL ARTHROPLASTY REVISION    Medications prior to admission:   Prior to Admission medications    Medication Sig Start Date End Date Taking? Authorizing Provider   docusate sodium (COLACE) 100 MG capsule Take 100 mg by mouth in the morning and at bedtime   Yes Historical Provider, MD   meloxicam (MOBIC) 7.5 MG tablet Take 7.5 mg by mouth daily Indications: Arthritis    Historical Provider, MD   aspirin 81 MG EC tablet Take 81 mg by mouth daily    Historical Provider, MD   Omega-3 Fatty Acids (FISH OIL) 1200 MG CAPS Take 1 capsule by mouth daily    Historical Provider, MD   omeprazole (PRILOSEC) 20 MG delayed release capsule Take 20 mg by mouth daily Indications: Reflux Gastritis    Historical Provider, MD   rOPINIRole (REQUIP) 0.5 MG tablet Take 0.5 mg by mouth nightly Indications: Restless Leg Syndrome    Historical Provider, MD   Testosterone (ANDROGEL) 20.25 MG/ACT (1.62%) GEL gel Place 1 actuation onto the skin daily.     Historical Provider, MD   sildenafil (VIAGRA) 100 MG tablet Take 50 mg by mouth as needed for Erectile Dysfunction    Historical Provider, MD   brimonidine (ALPHAGAN) 0.2 % ophthalmic solution Place 1 drop into both eyes 2 times daily Indications: Glaucoma    Historical Provider, MD   latanoprost (XALATAN) 0.005 % ophthalmic solution Place 1 drop into both eyes nightly Indications: Glaucoma    Historical Provider, MD   atorvastatin (LIPITOR) 40 MG tablet Take 20 mg by mouth nightly Indications: Changes in Cholesterol    Historical Provider, MD   Multiple Vitamins-Minerals (THERAPEUTIC MULTIVITAMIN-MINERALS) tablet Take 1 tablet by mouth daily    Historical Provider, MD   tamsulosin (FLOMAX) 0.4 MG capsule Take 0.4 mg by mouth nightly    Historical Provider, MD       Current medications:    Current Facility-Administered Medications   Medication Dose Route Frequency Provider Last Rate Last Admin    dexamethasone (PF) (DECADRON) injection 8 mg  8 mg IntraVENous Once Andrew Marshall MD        lactated ringers IV soln infusion   IntraVENous Continuous Andrew Marshall MD        sodium chloride flush 0.9 % injection 5-40 mL  5-40 mL IntraVENous 2 times per day Andrew Marshall MD        sodium chloride flush 0.9 % injection 5-40 mL  5-40 mL IntraVENous PRN Andrew Marshall MD        0.9 % sodium chloride infusion   IntraVENous PRN Andrew Marshall MD        ceFAZolin (ANCEF) 2,000 mg in sterile water 20 mL IV syringe  2,000 mg IntraVENous On Call to [address] Ant Varela MD        lactated ringers IV soln infusion   IntraVENous Continuous Sylvain Patterson  mL/hr at 03/09/23 1210 New Bag at 03/09/23 1210       Allergies:  No Known Allergies    Problem List:  There is no problem list on file for this patient. Past Medical History:        Diagnosis Date    Arthritis     COVID-19 2021    cough    ED (erectile dysfunction)     GERD (gastroesophageal reflux disease)     Hip pain     Hyperlipidemia        Past Surgical History:        Procedure Laterality Date    CARDIAC CATHETERIZATION      \"no blockage, just one vein routed weird\"    HERNIA REPAIR      JOINT REPLACEMENT Right 08/2018    right hip    KNEE ARTHROSCOPY Right     TENDON TRANSFER UPPER ARM Left        Social History:    Social History     Tobacco Use    Smoking status: Former     Types: Cigars, Pipe    Smokeless tobacco: Never    Tobacco comments:     Hx of occ cigar and pipe, former at this point   Substance Use Topics    Alcohol use:  Yes     Alcohol/week: 14.0 standard drinks     Types: 2 Glasses of wine, 12 Cans of beer per week                                Counseling given: Not Answered  Tobacco comments: Hx of occ cigar and pipe, former at this point      Vital Signs (Current):   Vitals:    03/09/23 1203   Pulse: 75   Resp: 16   Temp: 97.6 °F (36.4 °C)   TempSrc: Tympanic   SpO2: 98%   Weight: 177 lb (80.3 kg)   Height: 5' 5\" (1.651 m)                                              BP Readings from Last 3 Encounters:   No data found for BP       NPO Status: Time of last liquid consumption: 2130                        Time of last solid consumption: 2130                        Date of last liquid consumption: 03/08/23                        Date of last solid food consumption: 03/08/23    BMI:   Wt Readings from Last 3 Encounters:   03/09/23 177 lb (80.3 kg)   02/24/23 177 lb (80.3 kg)     Body mass index is 29.45 kg/m². CBC:   Lab Results   Component Value Date/Time    WBC 6.3 02/24/2023 09:40 AM    RBC 4.35 02/24/2023 09:40 AM    HGB 13.6 02/24/2023 09:40 AM    HCT 39.5 02/24/2023 09:40 AM    MCV 90.8 02/24/2023 09:40 AM    RDW 14.3 02/24/2023 09:40 AM     02/24/2023 09:40 AM       CMP:   Lab Results   Component Value Date/Time     02/24/2023 09:40 AM    K 3.9 02/24/2023 09:40 AM     02/24/2023 09:40 AM    CO2 24 02/24/2023 09:40 AM    BUN 15 02/24/2023 09:40 AM    CREATININE 0.9 02/24/2023 09:40 AM    LABGLOM >60 02/24/2023 09:40 AM    GLUCOSE 126 02/24/2023 09:40 AM    CALCIUM 8.7 02/24/2023 09:40 AM       POC Tests: No results for input(s): POCGLU, POCNA, POCK, POCCL, POCBUN, POCHEMO, POCHCT in the last 72 hours.     Coags:   Lab Results   Component Value Date/Time    PROTIME 14.7 02/24/2023 09:40 AM    INR 1.15 02/24/2023 09:40 AM    APTT 36.1 02/24/2023 09:40 AM       HCG (If Applicable): No results found for: PREGTESTUR, PREGSERUM, HCG, HCGQUANT     ABGs: No results found for: PHART, PO2ART, AJM1KZQ, SMS8WQT, BEART, C2WFKCXW     Type & Screen (If Applicable):  No results found for: LABABO, LABRH    Drug/Infectious Status (If Applicable):  No results found for: HIV, HEPCAB    COVID-19 Screening (If Applicable):   Lab Results   Component Value Date/Time    COVID19 Not Detected 03/07/2023 01:25 PM           Anesthesia Evaluation  Patient summary reviewed no history of anesthetic complications:   Airway: Mallampati: I  TM distance: >3 FB   Neck ROM: full  Mouth opening: > = 3 FB   Dental:          Pulmonary:normal exam  breath sounds clear to auscultation      (-) asthma, recent URI, sleep apnea and not a current smoker          Patient did not smoke on day of surgery. Cardiovascular:  Exercise tolerance: good (>4 METS),   (+) hyperlipidemia    (-) pacemaker, hypertension, past MI, CABG/stent and  angina    ECG reviewed  Rhythm: regular  Rate: normal           Beta Blocker:  Not on Beta Blocker         Neuro/Psych:      (-) seizures, TIA and CVA           GI/Hepatic/Renal:   (+) GERD: well controlled,      (-) liver disease and no renal disease       Endo/Other:        (-) diabetes mellitus, hypothyroidism, hyperthyroidism               Abdominal:             Vascular:     - DVT. Other Findings:           Anesthesia Plan      general     ASA 2     (Preop famotidine)  Induction: intravenous. MIPS: Postoperative opioids intended and Prophylactic antiemetics administered. Anesthetic plan and risks discussed with patient and spouse. Use of blood products discussed with patient and spouse whom consented to blood products.                      Sotero Daniel MD   3/9/2023

## 2023-03-09 NOTE — DISCHARGE INSTRUCTIONS
[facility], [place]  Dr. Palak Samson      Total Hip & Bipolar Replacement  Home Instructions     To prevent blood clots, you have been placed on the following medication:  Aspirin 81 mg twice a day for four weeks    Surgical Site Care: Showering is permitted on post op day 2 - Saturday  No submersion in a bath, swimming pool, whirlpool, etc     Home physical therapy 2 days a week and a once a week nurse will be arranged before you get home    Weight Bearing Status: Toe touch weight bearing on right leg for 3 weeks  Use a walker    Precautions  Don't walk for exercise (The longer you are on your feet the more sore you will be)  Stay close to home  You may go for short car rides    Pain Medications  You were given a prescription to fill at your pharmacy  Wean off pain medications as you deem appropriate as long as pain is under control  Take tylenol instead of the pain medicine as you improve                                                                                                                           FEVER of 101.5 or less  Please take a stool softener such as Colace to prevent constipation                   Tylenol x 2                                                                                                                                       Deep breath x 10  Do not drive for three weeks                                                                                  Cough, cough, cough  DO NOT SMOKE, VAPE OR CHEW!!! Recheck in 1 - 11/2 hours    Cold packs  May be used every 2 hours for 15-30 minutes as necessary  Be sure to have a barrier (cloth, clothing, towel) between the site and the ice pack to prevent frostbite    Contact office if  Increased redness, swelling, drainage of any kind, and/or severe pain at surgery site. As well as new onset fevers and or chills.   These could signify an infection. Calf tenderness to touch as well as increased swelling or redness. This could signify a clot. Any rash appears, increased  or new onset nausea/vomiting occur. This may indicate a reaction to a medication. Phone # [phone]  ext [phone]. Leave a message for my assistant Maddie Mark. She will return your call promptly  If you have an emergency text me at [phone] and tell me your name and problem  (Dr. Justin Orozco)  Follow up with Surgeon at scheduled appointment time.

## 2023-03-09 NOTE — DISCHARGE INSTR - DIET

## 2023-03-10 LAB
AFB SMEAR: NORMAL
ANION GAP SERPL CALCULATED.3IONS-SCNC: 8 MMOL/L (ref 7–19)
BUN BLDV-MCNC: 18 MG/DL (ref 8–23)
CALCIUM SERPL-MCNC: 8.2 MG/DL (ref 8.8–10.2)
CHLORIDE BLD-SCNC: 102 MMOL/L (ref 98–111)
CO2: 24 MMOL/L (ref 22–29)
CREAT SERPL-MCNC: 1.1 MG/DL (ref 0.5–1.2)
GFR SERPL CREATININE-BSD FRML MDRD: >60 ML/MIN/{1.73_M2}
GLUCOSE BLD-MCNC: 170 MG/DL (ref 74–109)
HCT VFR BLD CALC: 36.7 % (ref 42–52)
HEMOGLOBIN: 11.9 G/DL (ref 14–18)
POTASSIUM REFLEX MAGNESIUM: 4.3 MMOL/L (ref 3.5–5)
SODIUM BLD-SCNC: 134 MMOL/L (ref 136–145)

## 2023-03-10 PROCEDURE — 97116 GAIT TRAINING THERAPY: CPT

## 2023-03-10 PROCEDURE — 80048 BASIC METABOLIC PNL TOTAL CA: CPT

## 2023-03-10 PROCEDURE — 85014 HEMATOCRIT: CPT

## 2023-03-10 PROCEDURE — 1210000000 HC MED SURG R&B

## 2023-03-10 PROCEDURE — 97161 PT EVAL LOW COMPLEX 20 MIN: CPT

## 2023-03-10 PROCEDURE — 97530 THERAPEUTIC ACTIVITIES: CPT

## 2023-03-10 PROCEDURE — 85018 HEMOGLOBIN: CPT

## 2023-03-10 PROCEDURE — 94760 N-INVAS EAR/PLS OXIMETRY 1: CPT

## 2023-03-10 PROCEDURE — 6370000000 HC RX 637 (ALT 250 FOR IP): Performed by: ORTHOPAEDIC SURGERY

## 2023-03-10 PROCEDURE — 94150 VITAL CAPACITY TEST: CPT

## 2023-03-10 PROCEDURE — 36415 COLL VENOUS BLD VENIPUNCTURE: CPT

## 2023-03-10 PROCEDURE — 97165 OT EVAL LOW COMPLEX 30 MIN: CPT

## 2023-03-10 PROCEDURE — 6360000002 HC RX W HCPCS: Performed by: ORTHOPAEDIC SURGERY

## 2023-03-10 PROCEDURE — 2580000003 HC RX 258: Performed by: ORTHOPAEDIC SURGERY

## 2023-03-10 RX ADMIN — PANTOPRAZOLE SODIUM 40 MG: 40 TABLET, DELAYED RELEASE ORAL at 05:16

## 2023-03-10 RX ADMIN — MELOXICAM 7.5 MG: 7.5 TABLET ORAL at 10:00

## 2023-03-10 RX ADMIN — ACETAMINOPHEN 650 MG: 325 TABLET ORAL at 10:00

## 2023-03-10 RX ADMIN — CEFAZOLIN 2000 MG: 2 INJECTION, POWDER, FOR SOLUTION INTRAMUSCULAR; INTRAVENOUS at 01:18

## 2023-03-10 RX ADMIN — TAMSULOSIN HYDROCHLORIDE 0.4 MG: 0.4 CAPSULE ORAL at 20:22

## 2023-03-10 RX ADMIN — DOCUSATE SODIUM 100 MG: 100 CAPSULE, LIQUID FILLED ORAL at 10:00

## 2023-03-10 RX ADMIN — OXYCODONE HYDROCHLORIDE 5 MG: 5 TABLET ORAL at 15:43

## 2023-03-10 RX ADMIN — ASPIRIN 325 MG: 325 TABLET, COATED ORAL at 09:59

## 2023-03-10 RX ADMIN — MULTIPLE VITAMINS W/ MINERALS TAB 1 TABLET: TAB at 10:00

## 2023-03-10 RX ADMIN — LATANOPROST 1 DROP: 50 SOLUTION OPHTHALMIC at 20:23

## 2023-03-10 RX ADMIN — BRIMONIDINE TARTRATE 1 DROP: 2 SOLUTION OPHTHALMIC at 20:23

## 2023-03-10 RX ADMIN — CEFAZOLIN 2000 MG: 2 INJECTION, POWDER, FOR SOLUTION INTRAMUSCULAR; INTRAVENOUS at 10:00

## 2023-03-10 RX ADMIN — DOCUSATE SODIUM 100 MG: 100 CAPSULE, LIQUID FILLED ORAL at 20:22

## 2023-03-10 RX ADMIN — ASPIRIN 325 MG: 325 TABLET, COATED ORAL at 20:22

## 2023-03-10 RX ADMIN — ATORVASTATIN CALCIUM 20 MG: 20 TABLET, FILM COATED ORAL at 20:22

## 2023-03-10 RX ADMIN — OXYCODONE HYDROCHLORIDE 5 MG: 5 TABLET ORAL at 10:44

## 2023-03-10 RX ADMIN — ACETAMINOPHEN 650 MG: 325 TABLET ORAL at 20:22

## 2023-03-10 RX ADMIN — SODIUM CHLORIDE, PRESERVATIVE FREE 10 ML: 5 INJECTION INTRAVENOUS at 10:00

## 2023-03-10 RX ADMIN — ACETAMINOPHEN 650 MG: 325 TABLET ORAL at 15:40

## 2023-03-10 RX ADMIN — ACETAMINOPHEN 650 MG: 325 TABLET ORAL at 05:17

## 2023-03-10 RX ADMIN — SODIUM CHLORIDE, PRESERVATIVE FREE 10 ML: 5 INJECTION INTRAVENOUS at 20:26

## 2023-03-10 RX ADMIN — OXYCODONE HYDROCHLORIDE 5 MG: 5 TABLET ORAL at 05:16

## 2023-03-10 RX ADMIN — ROPINIROLE HYDROCHLORIDE 0.5 MG: 0.5 TABLET, FILM COATED ORAL at 20:22

## 2023-03-10 RX ADMIN — BRIMONIDINE TARTRATE 1 DROP: 2 SOLUTION OPHTHALMIC at 10:44

## 2023-03-10 ASSESSMENT — PAIN SCALES - GENERAL
PAINLEVEL_OUTOF10: 4
PAINLEVEL_OUTOF10: 4
PAINLEVEL_OUTOF10: 2
PAINLEVEL_OUTOF10: 4
PAINLEVEL_OUTOF10: 3
PAINLEVEL_OUTOF10: 4

## 2023-03-10 ASSESSMENT — PAIN DESCRIPTION - LOCATION
LOCATION: KNEE
LOCATION: HIP

## 2023-03-10 ASSESSMENT — PAIN - FUNCTIONAL ASSESSMENT
PAIN_FUNCTIONAL_ASSESSMENT: ACTIVITIES ARE NOT PREVENTED
PAIN_FUNCTIONAL_ASSESSMENT: PREVENTS OR INTERFERES SOME ACTIVE ACTIVITIES AND ADLS

## 2023-03-10 ASSESSMENT — PAIN DESCRIPTION - ORIENTATION
ORIENTATION: RIGHT

## 2023-03-10 ASSESSMENT — PAIN DESCRIPTION - DESCRIPTORS
DESCRIPTORS: ACHING
DESCRIPTORS: ACHING;DISCOMFORT;SORE
DESCRIPTORS: ACHING

## 2023-03-10 ASSESSMENT — PAIN DESCRIPTION - PAIN TYPE: TYPE: ACUTE PAIN

## 2023-03-10 ASSESSMENT — PAIN DESCRIPTION - FREQUENCY: FREQUENCY: CONTINUOUS

## 2023-03-10 NOTE — PROGRESS NOTES
Physical Therapy  Facility/Department: [facility] SURG SERVICES  Physical Therapy Initial Assessment    Name: Alverto Us  : 1945  MRN: [id number]  Date of Service: 3/10/2023    Discharge Recommendations:  Continue to assess pending progress, 24 hour supervision or assist, Patient would benefit from continued therapy after discharge (Pt requires cues for WB status during transfers at this time. Pt would benefit from 24/7 care at this time but may progress to PRN. Pt is planning to DC to SNF.)   PT Equipment Recommendations  Other: assessing      Patient Diagnosis(es): The encounter diagnosis was Loosening of prosthetic hip, initial encounter ([facility] [address] Past Medical History:  has a past medical history of Arthritis, COVID-19, ED (erectile dysfunction), GERD (gastroesophageal reflux disease), Hip pain, and Hyperlipidemia. Past Surgical History:  has a past surgical history that includes joint replacement (Right, 2018); Tendon transfer upper arm (Left); hernia repair; Knee arthroscopy (Right); and Cardiac catheterization. Assessment   Body Structures, Functions, Activity Limitations Requiring Skilled Therapeutic Intervention: Decreased functional mobility ; Decreased ADL status; Decreased ROM; Decreased strength;Decreased endurance;Decreased safe awareness  Assessment: Pt would benefit from skilled PT in order to address deficits and plan DC. Pt requires verbal cues for maintaining WB status and safety. Pt requries assist with bed mobitliy and transfers at this time. Cont to progress as tolerated.   Treatment Diagnosis: R NITHYA revision  Therapy Prognosis: Good  Decision Making: Low Complexity  Requires PT Follow-Up: Yes  Activity Tolerance  Activity Tolerance: Patient tolerated evaluation without incident;Patient tolerated treatment well     Plan   Physcial Therapy Plan  General Plan: 6-7 times per week  Therapy Duration: 2 Weeks  Current Treatment Recommendations: Strengthening, ROM, Balance training, Functional mobility training, Transfer training, ADL/Self-care training, Endurance training, Pain management, Gait training, Safety education & training, Patient/Caregiver education & training, Positioning, Therapeutic activities  Safety Devices  Type of Devices: Call light within reach, Chair alarm in place, Gait belt, Left in chair     Restrictions  Restrictions/Precautions  Restrictions/Precautions: Fall Risk, Weight Bearing  Lower Extremity Weight Bearing Restrictions  Right Lower Extremity Weight Bearing: Toe Touch Weight Bearing     Subjective   Pain: slight pain  General  Chart Reviewed: Yes  Patient assessed for rehabilitation services?: Yes  Additional Pertinent Hx: R NITHYA  Diagnosis: R NITHYA revision  Follows Commands: Within Functional Limits  Subjective  Subjective: Pt is agreeable to therapy         Social/Functional History  Social/Functional History  Lives With: Spouse  Bathroom Shower/Tub: Walk-in shower  Bathroom Equipment: Built-in shower seat  Home Equipment: [employer] Help From: Family  ADL Assistance: Independent  Ambulation Assistance: Independent  Transfer Assistance: Independent  Vision/Hearing  Vision  Vision: Impaired  Vision Exceptions: Wears glasses at all times  Hearing  Hearing: Exceptions to [facility]  Hearing Exceptions: Hard of hearing/hearing concerns;Bilateral hearing aid    Cognition   Orientation  Overall Orientation Status: Within Functional Limits  Cognition  Overall Cognitive Status: WFL     Objective   Heart Rate: 72  Heart Rate Source: Monitor  BP: (!) 99/50  BP Location: Left upper arm  Patient Position: Supine  MAP (Calculated): 66  Resp: 16  SpO2: 93 %  O2 Device: None (Room air)     Observation/Palpation  Posture: Good  Observation: IV,  Gross Assessment  AROM: Generally decreased, functional  PROM: Generally decreased, functional  Strength: Generally decreased, functional     AROM RLE (degrees)  RLE AROM: WFL  RLE General AROM: as expected post op  AROM LLE (degrees)  LLE AROM : WNL  Strength RLE  Strength RLE: WFL  Comment: as expected post op  Strength LLE  Strength LLE: WFL  Comment: grossly 3+/5           Bed mobility  Supine to Sit: Minimal assistance  Bed Mobility Comments: assist for RLE  Transfers  Sit to Stand: Contact guard assistance  Stand to Sit: Contact guard assistance (Pt required max cues for maintaining WB status. Pt was wanting to put full weight on RLE to sit. Cues to extend RLE out to avoid this.)  Comment: Verbal cues for TTWB  Ambulation  WB Status: TTWB  Ambulation  Surface: Level tile  Device: Rolling Walker  Assistance: Contact guard assistance  Distance: 10'  Comments: Pt was able to maintain WB status and has good BUE strength to perfrom hop-through gait with RW.      Balance  Posture: Good  Sitting - Static: Good  Sitting - Dynamic: Good  Standing - Static: Good  Standing - Dynamic: Good;-           OutComes Score                                                  AM-PAC Score             Tinneti Score       Goals  Short Term Goals  Time Frame for Short Term Goals: 14 days  Short Term Goal 1: amb 48' with RW while maintaining WB status, SBA  Short Term Goal 2: sit <> stand, SBA w/o verbal cues for WB status  Short Term Goal 3: bed mobility, SBA       Education  Patient Education  Education Given To: Patient  Education Provided: Role of Therapy;Plan of Care;Precautions  Education Provided Comments: WB status and posterior hip precautions  Education Method: Demonstration;Verbal  Barriers to Learning: None;Hearing  Education Outcome: Verbalized understanding;Demonstrated understanding;Continued education needed      Therapy Time   Individual Concurrent Group Co-treatment   Time In           Time Out           Minutes                   Tom Bingham   Electronically signed by Tom Bingham, Student PT on 3/10/2023 at 9:57 AM

## 2023-03-10 NOTE — ANESTHESIA POSTPROCEDURE EVALUATION
Department of Anesthesiology  Postprocedure Note    Patient: Tegan Swenson  MRN: [id number]  YOB: 1945  Date of evaluation: 3/9/2023      Procedure Summary     Date: 03/09/23 Room / Location: [address]    Anesthesia Start: 1632 Anesthesia Stop: 1929    Procedure: RIGHT HIP TOTAL ARTHROPLASTY REVISION (Right: Hip) Diagnosis:       Loos[facility]      (Loosening of prosthetic hip, initial encounter [facility]) [Q15.853M, Z96.649])    Surgeons: Ida Holcomb MD Responsible Provider: PARISA Harrell CRNA    Anesthesia Type: general ASA Status: 2          Anesthesia Type: No value filed.     Louis Phase I: Louis Score: 10    Louis Phase II:        Anesthesia Post Evaluation    Patient location during evaluation: PACU  Patient participation: complete - patient participated  Level of consciousness: awake and alert  Pain score: 0  Airway patency: patent  Nausea & Vomiting: no nausea and no vomiting  Complications: no  Cardiovascular status: blood pressure returned to baseline and hemodynamically stable  Respiratory status: room air and acceptable  Hydration status: euvolemic

## 2023-03-10 NOTE — ACP (ADVANCE CARE PLANNING)
Health Care Decision Maker  Documented consistent with Next of Kin Mary    Primary Decision Maker- Josef Pearce- spouse

## 2023-03-10 NOTE — PROGRESS NOTES
4 Eyes Skin Assessment    Deepika Zhou is being assessed upon: Admission    I agree that Sachi Shi RN, along with dawna RN   (either 2 RN's or 1 LPN and 1 RN) have performed a thorough Head to Toe Skin Assessment on the patient. ALL assessment sites listed below have been assessed. Areas assessed by both nurses:     [x]   Head, Face, and Ears   [x]   Shoulders, Back, and Chest  [x]   Arms, Elbows, and Hands   [x]   Coccyx, Sacrum, and Ischium  [x]   Legs, Feet, and Heels    Does the Patient have Skin Breakdown?  No    Ken Prevention initiated: No  Wound Care Orders initiated: No    WOC nurse consulted for Pressure Injury (Stage 3,4, Unstageable, DTI, NWPT, and Complex wounds) and New or Established Ostomies: No        Primary Nurse eSignature: Andrew Perales RN on 3/9/2023 at 11:50 PM      Co-Signer eSignature: Electronically signed by Baltazar Fontaine RN on 3/9/23 at 11:57 PM CST

## 2023-03-10 NOTE — CARE COORDINATION
HH referral received. Per chart, patient will dc to skilled facility.   No further HH interventions identified    Electronically signed by Laureen Middleton on 3/10/23 at 10:00 AM CST

## 2023-03-10 NOTE — OP NOTE
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 We also used a +3.528 mm cobalt chrome head inside of a 42 mm diameter dual mobility head by Sissy Matthews. Also used a size 42 inside diameter 54 outside diameter dual mobility liner. Implants:   Implant Name Type Inv. Item Serial No.  Lot No. LRB No. Used Action   INSERT TIB 28X42 MM 2 MOBILITY XLPE OR3O - [id number]  INSERT TIB 28X42 MM 2 MOBILITY XLPE OR3O  [employer]- [id number] Right 1 Implanted   LINER ACET 42X54 MM HIP 2 MOBILITY XLPE OR3O - [id number]  LINER ACET 42X54 MM HIP 2 MOBILITY XLPE OR3O  [employer]- [id number] Right 1 Implanted   NICOLE SL REVISION STEM 135 NK ANGLE 16MM X 265MM - [id number]  NICOLE SL REVISION STEM 135 NK ANGLE 16MM X 265MM  [employer]- [id number] Right 1 Implanted   carroll biomet femoral head     [id number] Right 1 Implanted       Assistant: Cyndi Sue      Anesthesia: General    Estimated Blood Loss: 250 mL    Tourniquet Time: None    Specimens: 3 cultures    Complications:  None    Findings:  As above      Procedure:  Patient was brought into the operating room and general endotracheal anesthetic was placed. Patient was placed in a lateral decubitus position. He was fastened the OR table using the pegboard system. The extremity was prepped with chlorhexidine alcohol and sterilely draped. Patient was about a half in short on the right side. Old lateral incision was teed posteriorly at its midsection with a scalpel. Fascia divided with the Bovtaylor. Charnley retractor placed. Sciatic nerve identified protected.   Short external rotators posterior capsule were taken off the back of the greater trochanter using the Bovie. Capsulectomy was performed. Femoral component was grossly loose. Flexed 80 ducted internally rotated the hip dislocating the hip. High-speed bur was used laterally on the proximal femur to allow the stem to come out easily by hand. Cement mantle came with it. The femoral canal was curetted. The distal femoral canal was found with a sharp long canal finder. C-arm brought in to verify we were down the canal past the defect in the lateral mid cortex of the femur. Wire was placed down the femur. Flexible reamers were used to ream to 13-1/2 mm. The canal was then hand reamed with straight reamers for the [facility] system. Went to a depth of 265 and in diameter 16 mm. Trial was placed about 15 degrees of anteversion. It was axially and rotationally stable after impaction down to an anatomic location. Attention turned to the acetabulum. Retractors placed around the acetabulum. The acetabular poly was removed it was a 32 no offset liner no lipped. It was removed with 1/4 inch osteotome and a mallet. The remaining shell was well fixed. The dual mobility liner was snapped into this and impacted. The hip was trialed with a +3.5 mobility head. Neck length. Produce. Hip was very stable in the sleeping position. It was stable in extension adduction external rotation. It was stable at 90 degrees of hip flexion neutral adduction internal rotation 45 degrees. Hip was dislocated. The trial stem was removed and the actual stem was impacted down to the same depth as the trial at about 15 degrees of anteversion. It was very stable axially and rotationally. The +3.5 mm head dual mobility assembly was impacted on the clean dry trunnion. Hip reduced. C arm images showed good reduction of the hip joint. Leg lengths are even. The stem filled the canal very nicely and bypassed the defect in the proximal lateral femur.   Short external rotators posterior capsule repaired back to the posterior aspect of the greater trochanter through drill holes using #2 max braid sutures. Sciatic nerve was protected. Interval interval between the gluteus minimus and piriformis was reapproximated with the same suture. The fascia was closed with #2 Vicryl interrupted sutures. Subcu closed with 2-0 Vicryl. Skin closed with 3-0 Vicryl and Prineo. Plan will be for posterior hip precautions. Toe-touch weightbearing for 3 weeks. He was awakened extubated and transferred to cover room in stable condition.           Electronically signed by Bassam Major MD on 3/9/2023 at 6:43 PM

## 2023-03-10 NOTE — PROGRESS NOTES
PHARMACY NOTE  Sidney Harrington was ordered sildenafil (Viagra). Per the [facility] Formulary Committee, this medication is non-formulary and not stocked by pharmacy. It has been discontinued. The medication can be reordered at discharge.      Electronically signed by MITA Carrillo [facility] on 3/9/2023 at 9:09 PM

## 2023-03-10 NOTE — PROGRESS NOTES
Occupational Therapy  Facility/Department: [facility] SURG SERVICES  Occupational Therapy Initial Assessment    Name: Froilan Cheng  : 1945  MRN: [id number]  Date of Service: 3/10/2023    Discharge Recommendations:  24 hour supervision or assist, Patient would benefit from continued therapy after discharge          Patient Diagnosis(es): The encounter diagnosis was Loosening of prosthetic hip, initial encounter ([facility] [address] Past Medical History:  has a past medical history of Arthritis, COVID-19, ED (erectile dysfunction), GERD (gastroesophageal reflux disease), Hip pain, and Hyperlipidemia. Past Surgical History:  has a past surgical history that includes joint replacement (Right, 2018); Tendon transfer upper arm (Left); hernia repair; Knee arthroscopy (Right); and Cardiac catheterization. Assessment   Performance deficits / Impairments: Decreased functional mobility ; Decreased safe awareness;Decreased balance;Decreased ADL status; Decreased endurance;Decreased high-level IADLs;Decreased strength  Assessment: Pt would benefit from skilled OT to progress functional mobility, transfers and ADL. Anticipate pt will require 24 hr assist/supervision at d/c to maintain WB and posterior precautions for RLE.   REQUIRES OT FOLLOW-UP: Yes  Activity Tolerance  Activity Tolerance: Patient Tolerated treatment well        Plan   Occupational Therapy Plan  Times Per Week: 3-5  Current Treatment Recommendations: Strengthening, Balance training, Functional mobility training, Endurance training, Pain management, Safety education & training, Equipment evaluation, education, & procurement, Patient/Caregiver education & training, Self-Care / ADL, Home management training     Restrictions  Restrictions/Precautions  Restrictions/Precautions: Fall Risk, Weight Bearing  Lower Extremity Weight Bearing Restrictions  Right Lower Extremity Weight Bearing: Toe Touch Weight Bearing  Position Activity Restriction  Hip Precautions: Posterior hip precautions    Subjective   General  Patient assessed for rehabilitation services?: Yes     Social/Functional History  Social/Functional History  Lives With: Spouse  Bathroom Shower/Tub: Walk-in shower  Bathroom Equipment: Built-in shower seat  Home Equipment: Cane  Receives Help From: Family  ADL Assistance: Independent  Homemaking Responsibilities: No  Ambulation Assistance: Independent  Transfer Assistance: Independent       Objective   Heart Rate: 72  Heart Rate Source: Monitor  BP: (!) 99/50  BP Location: Left upper arm  Patient Position: Supine  MAP (Calculated): 66  Resp: 16  SpO2: 93 %  O2 Device: None (Room air)          Observation/Palpation  Posture: Good  Observation: IV,  Safety Devices  Type of Devices: Call light within reach; Chair alarm in place;Gait belt;Left in chair     Toilet Transfers  Toilet - Technique: Stand step  Equipment Used: Standard bedside commode  Toilet Transfer: Minimal assistance  Toilet Transfers Comments: cues for WB prec stand to sit     ADL  Feeding: Independent;Setup  Grooming: Independent;Setup  UE Bathing: Independent;Setup  LE Bathing: Moderate assistance  UE Dressing: Setup  LE Dressing: Maximum assistance  Toileting:  Moderate assistance (per clinical observation of prerequisite skills)     Activity Tolerance  Activity Tolerance: Patient tolerated evaluation without incident;Patient tolerated treatment well  Bed mobility  Supine to Sit: Minimal assistance  Transfers  Sit to stand: Contact guard assistance  Stand to sit: Contact guard assistance  Vision  Vision: Impaired  Vision Exceptions: Wears glasses at all times  Hearing  Hearing: Exceptions to [facility]  Hearing Exceptions: Hard of hearing/hearing concerns;Bilateral hearing aid  Cognition  Overall Cognitive Status: [facility]  Cognition Comment: awake, alert, following 1-step commands  Orientation  Overall Orientation Status: Within Functional Limits                  Education Given To: Patient  Education Provided: Role of Therapy;Transfer Training;Plan of Care;Precautions  Education Method: Demonstration;Verbal  Barriers to Learning: Hearing  Education Outcome: Continued education needed  LUE AROM (degrees)  LUE AROM : WFL  RUE AROM (degrees)  RUE AROM : WFL                       Goals  Short Term Goals  Time Frame for Short Term Goals: 2 weeks  Short Term Goal 1: Toilet with mod I  Short Term Goal 2: Toilet transfer with mod I  Short Term Goal 3: LB bathing/dressing with mod I, AE  Short Term Goal 4: Functional standing activities x 5 mins, SBA, maintaining WB precautions  Short Term Goal 5: Pt will demo hip and WB precautions during ADL and functional mobility without cues.          Lyndsay Truong OT  Electronically signed by Lyndsay Truong OT on 3/10/2023 at 12:21 PM

## 2023-03-10 NOTE — PROGRESS NOTES
Physical Therapy  Name: Khushboo Area  MRN:  [id number]  Date of service:  3/10/2023     03/10/23 1518   Restrictions/Precautions   Restrictions/Precautions Fall Risk;Weight Bearing   Lower Extremity Weight Bearing Restrictions   Right Lower Extremity Weight Bearing Toe Touch Weight Bearing   Position Activity Restriction   Hip Precautions Posterior hip precautions   Subjective   Subjective Pt agreed to therapy. Pain Assessment   Pain Assessment 0-10   Pain Level 4   Pain Location Hip   Pain Orientation Right   Pain Descriptors Aching   Functional Pain Assessment Prevents or interferes some active activities and ADLs   Pain Type Acute pain   Pain Frequency Continuous   Non-Pharmaceutical Pain Intervention(s) Ambulation/Increased Activity;Repositioned; Rest   Response to Pain Intervention Patient satisfied   Bed Mobility   Supine to Sit Minimal assistance   Sit to Supine Minimal assistance   Transfers   Sit to Stand Contact guard assistance   Stand to Sit Contact guard assistance   Ambulation   WB Status TTWB   Ambulation   Surface Level tile   Device Rolling Walker   Assistance Contact guard assistance   Distance 12'   Comments maintained TTWB fairly well with cueing a few times throughout   Exercises   Hip Flexion x10 LLE   Knee Long Arc Quad x5 RLE X10 LLE   Ankle Pumps X10 BLE   Short Term Goals   Time Frame for Short Term Goals 14 days   Short Term Goal 1 amb 48' with RW while maintaining WB status, SBA   Short Term Goal 2 sit <> stand, SBA w/o verbal cues for WB status   Short Term Goal 3 bed mobility, SBA   Conditions Requiring Skilled Therapeutic Intervention   Body Structures, Functions, Activity Limitations Requiring Skilled Therapeutic Intervention Decreased functional mobility ; Decreased ADL status; Decreased ROM; Decreased strength;Decreased endurance;Decreased safe awareness   Assessment Pt able to amb short distance, maintained WB status fairly well with cueing.  Tends to keep slightly fast pace, cued to Yes  Throws a ball overhand Yes    Best friend's name: Soni Stock food: eggs, ely, toast   Want to be Marlea Deck from the [employer] movie. Allergies:  Review of patient's allergies indicates no known allergies. Review of Systems   Constitutional: Negative for activity change, appetite change, fatigue and fever. HENT: Negative for congestion, ear pain, rhinorrhea, sneezing and sore throat. Eyes: Negative for discharge, redness and visual disturbance. Respiratory: Negative for snoring, cough, wheezing and stridor. Cardiovascular: Negative for chest pain and palpitations. Gastrointestinal: Negative for abdominal pain, constipation, diarrhea, nausea and vomiting. Endocrine: Negative for polyuria. Genitourinary: Negative for discharge, penile pain, penile swelling, scrotal swelling and testicular pain. Musculoskeletal: Negative for gait problem. Skin: Negative for rash. Allergic/Immunologic: Negative for environmental allergies and food allergies. Neurological: Negative for weakness and headaches. Psychiatric/Behavioral: Negative for behavioral problems and sleep disturbance. The patient is not hyperactive. Objective: There were no vitals taken for this visit. Physical Exam   Constitutional: He appears well-developed and well-nourished. HENT:   Head: Normocephalic and atraumatic. Right Ear: Tympanic membrane normal.   Left Ear: Tympanic membrane normal.   Nose: Nose normal.   Mouth/Throat: Mucous membranes are moist. Oropharynx is clear. Eyes: Conjunctivae, EOM and lids are normal. Red reflex is present bilaterally. Visual tracking is normal. Pupils are equal, round, and reactive to light. Neck: Normal range of motion. Cardiovascular: Regular rhythm, S1 normal and S2 normal.  Pulses are palpable. No murmur heard. Pulmonary/Chest: Effort normal and breath sounds normal. No respiratory distress. Abdominal: Soft.  Bowel sounds are normal. There is no slow for safety. Assisted back to bed with all needs in reach. Activity Tolerance   Activity Tolerance Patient tolerated treatment well   PT Plan of Care   Friday X   Safety Devices   Type of Devices Bed alarm in place;Call light within reach; Left in bed         Electronically signed by Selene Haddad PTA on 3/10/2023 at 3:25 PM hepatosplenomegaly. There is no tenderness. Genitourinary:   Genitourinary Comments: Deffered    Musculoskeletal: Normal range of motion. Neurological: He is alert. He has normal strength. Skin: Skin is warm and dry. No rash noted. Vitals reviewed. Growth parameters are noted and are appropriate for age    Assessment:      1. Encounter for routine child health examination without abnormal findings     2. Need for vaccination for disease combination             Plan:      No orders of the defined types were placed in this encounter. No orders of the defined types were placed in this encounter. Anticipatory Guidance:   Specific topics reviewed: importance of regular dental care, whole milk till 3years old then taper to lowfat or skim, importance of varied diet, minimize junk food, [employer] or other , safe storage of any firearms in the home, teaching child name, address, and phone number and teaching child how to deal with strangers. Immunizations today: DTaP, IPV, MMR and Varicella   Counseling for Immunizations / vaccine components done today. Discussed in detail potential adverse effects. All questions and concerns are answered. Mom/Parents verbalize understanding them and agree to have immunizations. Return in about 1 year (around 10/6/2018).     Kyler Green NP

## 2023-03-10 NOTE — CONSULTS
Referring Provider: Dr. Arauz  Reason for Consultation: Medical management    Patient Care Team:  PARISA Purvis CNP as PCP - General    Chief complaint right hip pain    Subjective . History of present illness: The patient presents to the orthopedic service for NITHYA. They have failed outpatient conservative treatment of NSAIDS, muscle relaxer, physical therapy and opioid pain meds. The pain is affecting their activities of daily living and they have chosen to undergo surgical correction. We have been asked by the attending physician to provide medical consultation in the catalina-operative phase. the patient understands our role in their healthcare during this hospitalization. All questions were encouraged and answered to the best of our ability. The postoperative pain is as expected. There are no other participating or relieving factors noted.        REVIEW OF SYSTEMS:    CONSTITUTIONAL:  Negative for anorexia, chills, fevers, night sweats and weight loss  EYES:  negative for eye dryness, icterus and redness  HEENT:   negative for dental problems, epistaxis, facial trauma and thrush  RESPIRATORY:  negative for chest tightness, cough, dyspnea on exertion, pneumonia and sputum  CARDIOVASCULAR: negative for chest pain, dyspnea, exertional chest pressure/discomfort, irregular heart beat, palpitations, paroxysmal nocturnal dyspnea and syncope  GASTROINTESTINAL:  negative for abdominal pain, hematemesis, jaundice, melena and rectal bleeding  MUSCULOSKELETAL:  negative for muscle weakness, myalgias and neck pain, chronic joint pain noted  NEUROLOGICAL:   negative for dizziness, headaches, seizures, speech problems, tremors and vertigo  INTEGUMENT: negative for pruritus, rash, skin color change and skin lesion(s)   A Full 14 point review of systems is negative outside those listed above and in the HPI      History    Past Medical History:   Diagnosis Date    Arthritis     COVID-19 2021    cough    ED (erectile dysfunction)     GERD (gastroesophageal reflux disease)     Hip pain     Hyperlipidemia      Past Surgical History:   Procedure Laterality Date    CARDIAC CATHETERIZATION      \"no blockage, just one vein routed weird\"    HERNIA REPAIR      JOINT REPLACEMENT Right 08/2018    right hip    KNEE ARTHROSCOPY Right     TENDON TRANSFER UPPER ARM Left      History reviewed. No pertinent family history. Social History     Tobacco Use    Smoking status: Former     Types: Cigars, Pipe    Smokeless tobacco: Never    Tobacco comments:     Hx of occ cigar and pipe, former at this point   Substance Use Topics    Alcohol use: Yes     Alcohol/week: 14.0 standard drinks     Types: 2 Glasses of wine, 12 Cans of beer per week     Medications Prior to Admission: docusate sodium (COLACE) 100 MG capsule, Take 100 mg by mouth in the morning and at bedtime  meloxicam (MOBIC) 7.5 MG tablet, Take 7.5 mg by mouth daily Indications: Arthritis  aspirin 81 MG EC tablet, Take 81 mg by mouth daily  Omega-3 Fatty Acids (FISH OIL) 1200 MG CAPS, Take 1 capsule by mouth daily  omeprazole (PRILOSEC) 20 MG delayed release capsule, Take 20 mg by mouth daily Indications: Reflux Gastritis  rOPINIRole (REQUIP) 0.5 MG tablet, Take 0.5 mg by mouth nightly Indications: Restless Leg Syndrome  Testosterone (ANDROGEL) 20.25 MG/ACT (1.62%) GEL gel, Place 1 actuation onto the skin daily.   sildenafil (VIAGRA) 100 MG tablet, Take 50 mg by mouth as needed for Erectile Dysfunction  brimonidine (ALPHAGAN) 0.2 % ophthalmic solution, Place 1 drop into both eyes 2 times daily Indications: Glaucoma  latanoprost (XALATAN) 0.005 % ophthalmic solution, Place 1 drop into both eyes nightly Indications: Glaucoma  atorvastatin (LIPITOR) 40 MG tablet, Take 20 mg by mouth nightly Indications: Changes in Cholesterol  Multiple Vitamins-Minerals (THERAPEUTIC MULTIVITAMIN-MINERALS) tablet, Take 1 tablet by mouth daily  tamsulosin (FLOMAX) 0.4 MG capsule, Take 0.4 mg by mouth nightly  Patient has no known allergies. Objective     Vital Signs   All pre-op and post op vitals reviewed           Physical Exam:  Constitutional: oriented to person, place, and time. appears well-developed. HEENT:   Head: Normocephalic and atraumatic. Eyes: Pupils are equal, round, and reactive to light. Neck: Neck supple. Cardiovascular: Regular rhythm and normal heart sounds. No lift or rub appreciated. Pulmonary/Chest: Effort normal and breath sounds normal. CTAB. No labored breating. Abdominal: Soft. Bowel sounds are normal. There is no appreciable  distension. There is no point tenderness. no rebounding or guarding. Musculoskeletal: Normal range of motion on other than surgically repaired joint . no edema or tenderness other than surgical area. Post-op changes noted  Neurological:  alert and oriented to person, place, and time. normal reflexes. No focal deficits  Skin: Skin is warm and dry. No new rashes appreciated. Results Review:   I reviewed the patient's new imaging results and agree with the interpretation. Hospital problem list/treatment recommendations:  Right hip pain SP right hip revision--postop care  Slow transit constipation--bowel regimen  GERD--Protonix, antireflux measures   Testicular hypofunction--TRT  Postop low Hgb--stable, expected, being monitored with daily labs, Hgb 11.9 check anemia profile, replace substrates as indicated  VTE prophylaxis--ASA  BPH--Flomax     Doing excellent postop day #1  Encourage I-S every hour while awake  Early mobilization, maximize efforts with therapy  Wound care monitor for infection  Follow with daily labs  DC planning SNF for rehab, OP follow-up with PCP Hortensia Anglin in [address] upon discharge    I discussed the patients findings and my recommendations with patient/family, staff and the Orthopaedic team.  Gaurav Ken PA-C  03/10/23  6:46 AM      Postop day #1-doing great with PT OT.   We will see how he does with therapy over the next day or 2 concerning discharge planning to either skilled nursing facility or home. Patient is medically stable. I have discussed the care of Froilan Cheng, including pertinent history and exam findings with the ARNP/PA. I have seen and examined the patient and the key elements of all parts of the encounter have been performed by me. I agree with the assessment and plan as outlined by the ARNP/PA. Please refer to my separate note for complete documentation.      Electronically signed by Harris Kirkpatrick MD on 3/10/2023 at 8:16 AM

## 2023-03-10 NOTE — PROGRESS NOTES
Subjective:     Post-Operative Day: 1 No complaints    Objective:     Patient Vitals for the past 24 hrs:   BP Temp Temp src Pulse Resp SpO2 Height Weight   03/10/23 0416 110/63 98.6 °F (37 °C) Temporal 73 18 98 % -- --   03/09/23 2343 127/75 98.1 °F (36.7 °C) Temporal 86 16 99 % -- --   03/09/23 2243 (!) 148/68 97.3 °F (36.3 °C) Temporal 79 18 97 % -- --   03/09/23 2156 (!) 158/77 97.5 °F (36.4 °C) Temporal 71 18 100 % -- --   03/09/23 2126 (!) 168/81 97.2 °F (36.2 °C) Temporal 69 18 96 % -- --   03/09/23 2048 133/77 97.2 °F (36.2 °C) Temporal 63 16 95 % -- --   03/09/23 2026 (!) 159/80 97.3 °F (36.3 °C) Temporal 69 15 95 % -- --   03/09/23 2015 (!) 160/85 (!) 96.4 °F (35.8 °C) Temporal 67 14 98 % -- --   03/09/23 2000 (!) 154/87 -- -- 65 11 98 % -- --   03/09/23 1955 (!) 166/89 -- -- 69 11 95 % -- --   03/09/23 1950 (!) 165/90 97.2 °F (36.2 °C) -- 68 13 94 % -- --   03/09/23 1945 (!) 164/87 -- -- 66 12 92 % -- --   03/09/23 1940 (!) 149/76 -- -- 66 11 93 % -- --   03/09/23 1935 (!) 144/81 -- -- 66 11 93 % -- --   03/09/23 1930 (!) 147/70 -- -- 68 11 95 % -- --   03/09/23 1926 (!) 147/70 97 °F (36.1 °C) Temporal 69 20 95 % -- --   03/09/23 1203 -- 97.6 °F (36.4 °C) Tympanic 75 16 98 % 5' 5\" (1.651 m) 177 lb (80.3 kg)       General: Alert cooperative   Wound: Clean dry intact. Moderate swelling   Neurovascular: Exam normal   DVT Exam: negative         Data Review:  Recent Labs     03/10/23  0227   HGB 11.9*     Recent Labs     03/10/23  0227   *   K 4.3   CREATININE 1.1   GLUCOSE 170*     No results for input(s): POCGLU in the last 72 hours. XR HIP 2-3 VW W PELVIS RIGHT   Final Result   Post right total hip arthroplasty. FLUORO FOR SURGICAL PROCEDURES    (Results Pending)       3/9/23 surgical cultures- NGTD  Assessment:     Status Post right femoral revision for painful loose femoral component after right cemented Total Hip Arthroplasty in 2019. Doing well postop without complications .    Plan: Pain control  Keep accuchecks under 200  PT/OT  DVT prophylaxis  Ice and elevate  Discharge To a non-[facility] facility this week

## 2023-03-10 NOTE — CARE COORDINATION
Pt wants to go to [facility] in [place].  Ref sent    [place]  [phone]  Electronically signed by Stanton Duncan on 3/10/2023 at 10:48 AM

## 2023-03-10 NOTE — PROGRESS NOTES
PHARMACY NOTE  Wilkerson Faster was ordered Fish Oil capsules. Per the [facility] Formulary Committee, this medication is non-formulary and not stocked by pharmacy. It has been discontinued. The medication can be reordered at discharge.      Electronically signed by Racquel Isaac [facility] on 3/9/2023 at 9:05 PM

## 2023-03-11 LAB
ANION GAP SERPL CALCULATED.3IONS-SCNC: 8 MMOL/L (ref 7–19)
BUN BLDV-MCNC: 18 MG/DL (ref 8–23)
CALCIUM SERPL-MCNC: 8.1 MG/DL (ref 8.8–10.2)
CHLORIDE BLD-SCNC: 102 MMOL/L (ref 98–111)
CO2: 25 MMOL/L (ref 22–29)
CREAT SERPL-MCNC: 1 MG/DL (ref 0.5–1.2)
GFR SERPL CREATININE-BSD FRML MDRD: >60 ML/MIN/{1.73_M2}
GLUCOSE BLD-MCNC: 154 MG/DL (ref 74–109)
HCT VFR BLD CALC: 31 % (ref 42–52)
HEMOGLOBIN: 10.5 G/DL (ref 14–18)
IRON SATURATION: 5 % (ref 14–50)
IRON: 12 UG/DL (ref 59–158)
POTASSIUM REFLEX MAGNESIUM: 4.5 MMOL/L (ref 3.5–5)
SODIUM BLD-SCNC: 135 MMOL/L (ref 136–145)
TOTAL IRON BINDING CAPACITY: 246 UG/DL (ref 250–400)
VITAMIN B-12: 664 PG/ML (ref 211–946)

## 2023-03-11 PROCEDURE — 2580000003 HC RX 258: Performed by: ORTHOPAEDIC SURGERY

## 2023-03-11 PROCEDURE — 6370000000 HC RX 637 (ALT 250 FOR IP): Performed by: ORTHOPAEDIC SURGERY

## 2023-03-11 PROCEDURE — 36415 COLL VENOUS BLD VENIPUNCTURE: CPT

## 2023-03-11 PROCEDURE — 1210000000 HC MED SURG R&B

## 2023-03-11 PROCEDURE — 97530 THERAPEUTIC ACTIVITIES: CPT

## 2023-03-11 PROCEDURE — 85018 HEMOGLOBIN: CPT

## 2023-03-11 PROCEDURE — 97116 GAIT TRAINING THERAPY: CPT

## 2023-03-11 PROCEDURE — 82607 VITAMIN B-12: CPT

## 2023-03-11 PROCEDURE — 85014 HEMATOCRIT: CPT

## 2023-03-11 PROCEDURE — 83540 ASSAY OF IRON: CPT

## 2023-03-11 PROCEDURE — 94760 N-INVAS EAR/PLS OXIMETRY 1: CPT

## 2023-03-11 PROCEDURE — 80048 BASIC METABOLIC PNL TOTAL CA: CPT

## 2023-03-11 PROCEDURE — 83550 IRON BINDING TEST: CPT

## 2023-03-11 RX ADMIN — DOCUSATE SODIUM 100 MG: 100 CAPSULE, LIQUID FILLED ORAL at 08:47

## 2023-03-11 RX ADMIN — PANTOPRAZOLE SODIUM 40 MG: 40 TABLET, DELAYED RELEASE ORAL at 05:35

## 2023-03-11 RX ADMIN — ASPIRIN 325 MG: 325 TABLET, COATED ORAL at 20:26

## 2023-03-11 RX ADMIN — DOCUSATE SODIUM 100 MG: 100 CAPSULE, LIQUID FILLED ORAL at 20:25

## 2023-03-11 RX ADMIN — OXYCODONE HYDROCHLORIDE 5 MG: 5 TABLET ORAL at 15:47

## 2023-03-11 RX ADMIN — SODIUM CHLORIDE, PRESERVATIVE FREE 10 ML: 5 INJECTION INTRAVENOUS at 20:29

## 2023-03-11 RX ADMIN — MELOXICAM 7.5 MG: 7.5 TABLET ORAL at 08:47

## 2023-03-11 RX ADMIN — LATANOPROST 1 DROP: 50 SOLUTION OPHTHALMIC at 20:27

## 2023-03-11 RX ADMIN — ACETAMINOPHEN 650 MG: 325 TABLET ORAL at 05:35

## 2023-03-11 RX ADMIN — ACETAMINOPHEN 650 MG: 325 TABLET ORAL at 08:47

## 2023-03-11 RX ADMIN — ACETAMINOPHEN 650 MG: 325 TABLET ORAL at 20:26

## 2023-03-11 RX ADMIN — TAMSULOSIN HYDROCHLORIDE 0.4 MG: 0.4 CAPSULE ORAL at 20:26

## 2023-03-11 RX ADMIN — BRIMONIDINE TARTRATE 1 DROP: 2 SOLUTION OPHTHALMIC at 08:47

## 2023-03-11 RX ADMIN — ACETAMINOPHEN 650 MG: 325 TABLET ORAL at 15:46

## 2023-03-11 RX ADMIN — MULTIPLE VITAMINS W/ MINERALS TAB 1 TABLET: TAB at 08:47

## 2023-03-11 RX ADMIN — ASPIRIN 325 MG: 325 TABLET, COATED ORAL at 08:47

## 2023-03-11 RX ADMIN — BRIMONIDINE TARTRATE 1 DROP: 2 SOLUTION OPHTHALMIC at 20:27

## 2023-03-11 RX ADMIN — ROPINIROLE HYDROCHLORIDE 0.5 MG: 0.5 TABLET, FILM COATED ORAL at 20:26

## 2023-03-11 RX ADMIN — ATORVASTATIN CALCIUM 20 MG: 20 TABLET, FILM COATED ORAL at 20:26

## 2023-03-11 ASSESSMENT — PAIN DESCRIPTION - ORIENTATION
ORIENTATION: RIGHT
ORIENTATION: RIGHT

## 2023-03-11 ASSESSMENT — PAIN SCALES - GENERAL
PAINLEVEL_OUTOF10: 2
PAINLEVEL_OUTOF10: 3
PAINLEVEL_OUTOF10: 2
PAINLEVEL_OUTOF10: 0

## 2023-03-11 ASSESSMENT — PAIN DESCRIPTION - LOCATION
LOCATION: HIP
LOCATION: HIP

## 2023-03-11 ASSESSMENT — PAIN DESCRIPTION - DESCRIPTORS
DESCRIPTORS: ACHING;DISCOMFORT
DESCRIPTORS: ACHING;DISCOMFORT;SORE

## 2023-03-11 NOTE — PROGRESS NOTES
Physician Progress Note      Vero Carey  CSN #:                  [id number]  :                       1945  ADMIT DATE:       3/9/2023 11:44 AM  DISCH DATE:  Chester Mcmullen  PROVIDER #:        Donald Seay MD          QUERY TEXT:    Patient admitted s/p right femoral revision. Pt noted to have BPH and was   treated with Flomax. ?If possible, please document in progress notes and   discharge summary if you are evaluating and/or treating any of the following: The medical record reflects the following:  Risk Factors: 76year-old male with hx of BPH  Clinical Indicators: noted as having BPH and denies frequency, urgency,   hesitancy, or incontinence  Treatment: Flomax    Thank you,  Omar Roper, [facility]  [phone]  Options provided:  -- BPH with partial/complete urinary obstruction  -- BPH with urinary retention without obstruction  -- Other - I will add my own diagnosis  -- Disagree - Not applicable / Not valid  -- Disagree - Clinically unable to determine / Unknown  -- Refer to Clinical Documentation Reviewer    PROVIDER RESPONSE TEXT:    Provider disagreed with this query.   from tetosterone/pain meds/anesthia    Query created by: Layla Balderas on 3/10/2023 10:06 AM      Electronically signed by:  Donald Seay MD 3/11/2023 10:25 AM

## 2023-03-11 NOTE — PROGRESS NOTES
Chief Complaint: Hip pain      Interval History:     Pain is reasonably controlled. He has been ambulating some. Feels like he is going to have bowel movement. Review of Systems:   General ROS: no chills or fever  Respiratory ROS: no cough, shortness of breath, or wheezing  Cardiovascular ROS: no chest pain or dyspnea on exertion  Gastrointestinal ROS: no abdominal pain, diarrhea or nausea/vomiting       Vitals: BP (!) 128/53   Pulse 78   Temp 97.9 °F (36.6 °C) (Temporal)   Resp 16   Ht 5' 5\" (1.651 m)   Wt 177 lb (80.3 kg)   SpO2 93%   BMI 29.45 kg/m²   24 hour intake/output:  Intake/Output Summary (Last 24 hours) at 3/11/2023 1643  Last data filed at 3/11/2023 1435  Gross per 24 hour   Intake 1470 ml   Output 750 ml   Net 720 ml     Last 3 weights:   Wt Readings from Last 3 Encounters:   03/09/23 177 lb (80.3 kg)   02/24/23 177 lb (80.3 kg)         Physical Exam:     General Appearance:    Alert, cooperative, in no acute distress  Head:    N/A  Throat:   N/A  Neck:   N/A  Lungs:   Clear to auscultation,respirations regular, even and unlabored  Heart:    Regular rhythm and normal rate, normal S1 and S2, no murmur, no gallop  Abdomen:     Normal bowel sounds, no masses, no organomegaly, soft, non-tender,   non-distended, no guarding, no rebound      Extremities:   No edema, no cyanosis, no clubbing  Pulses:   N/A  Skin:   N/A  Lymph nodes:   N/A  Neurologic:   N/A         Results Review:      Lab:  Recent Results (from the past 24 hour(s))   Basic Metabolic Panel w/ Reflex to MG    Collection Time: 03/11/23  2:43 AM   Result Value Ref Range    Sodium 135 (L) 136 - 145 mmol/L    Potassium reflex Magnesium 4.5 3.5 - 5.0 mmol/L    Chloride 102 98 - 111 mmol/L    CO2 25 22 - 29 mmol/L    Anion Gap 8 7 - 19 mmol/L    Glucose 154 (H) 74 - 109 mg/dL    BUN 18 8 - 23 mg/dL    Creatinine 1.0 0.5 - 1.2 mg/dL    Est, Glom Filt Rate >60 >60    Calcium 8.1 (L) 8.8 - 10.2 mg/dL   Hemoglobin and hematocrit, blood Collection Time: 03/11/23  2:43 AM   Result Value Ref Range    Hemoglobin 10.5 (L) 14.0 - 18.0 g/dL    Hematocrit 31.0 (L) 42.0 - 52.0 %   Iron and TIBC    Collection Time: 03/11/23  2:43 AM   Result Value Ref Range    Iron 12 (L) 59 - 158 ug/dL    TIBC 246 (L) 250 - 400 ug/dL    Iron Saturation 5 (L) 14 - 50 %   Vitamin B12    Collection Time: 03/11/23  2:43 AM   Result Value Ref Range    Vitamin B-12 664 211 - 946 pg/mL        Imaging:  Imaging study reports reviewed      ASSESSMENT:    Principal Problem:    Femoral loosening of prosthetic right hip[facility]  Resolved Problems:    * No resolved hospital problems. *      PLAN:    He is overall doing well. He states he is urinating okay. Feels bowels are close to moving. Pain is recently controlled.       Lana hSetty MD  03/11/23  4:43 PM

## 2023-03-11 NOTE — PROGRESS NOTES
03/11/23 1600   Subjective   Subjective Patient in bed wants to work with therapy. \" It only hurts when I move. \"   Cognition   Overall Cognitive Status WFL   Patient affect:   (Per visitor patient very [address], almost Deaf.)   Vitals   O2 Device None (Room air)   Bed Mobility Training   Bed Mobility Training Yes   Supine to Sit Modified independent   Balance   Sitting Intact   Standing With support   Transfer Training   Transfer Training Yes   Sit to Stand Contact-guard assistance   Stand to Sit Contact-guard assistance   Gait Training   Gait Training Yes   Gait   Gait Abnormalities   (TTWB, per MD orders.)   Distance (ft) 20 Feet   Assistive Device Walker, rolling   Weight Bearing   Weight Bearing Technique Yes   Response To Weight Bearing Technique Patient maintained TTWB R LE. Patient Education   Education Given To Patient   Education Provided Role of Therapy;Plan of Care;Precautions  (PHP)   Education Provided Comments WB status and posterior hip precautions   Education Method Demonstration;Verbal   Barriers to Learning Hearing   Education Outcome Verbalized understanding;Demonstrated understanding;Continued education needed   Other Specialty Interventions   Other Treatments/Modalities Patient in chair all needs in reach.    Assessment   Activity Tolerance Patient tolerated treatment well       Electronically signed by Tio Pizano PTA on 3/11/2023 at 4:33 PM

## 2023-03-11 NOTE — PROGRESS NOTES
Subjective:     Post-Operative Day: 2 No complaints    Objective:     Patient Vitals for the past 24 hrs:   BP Temp Temp src Pulse Resp SpO2   03/11/23 0724 (!) 120/54 97.5 °F (36.4 °C) Temporal 76 16 95 %   03/11/23 0342 (!) 121/59 99 °F (37.2 °C) Temporal 71 16 98 %   03/10/23 2028 (!) 126/58 98.6 °F (37 °C) Temporal 67 14 97 %   03/10/23 1613 -- -- -- -- 16 --   03/10/23 1114 -- -- -- -- 16 --         General: Alert cooperative   Wound: Clean dry intact. Moderate swelling   Neurovascular: Exam normal   DVT Exam: negative         Data Review:  Recent Labs     03/10/23  0227 03/11/23  0243   HGB 11.9* 10.5*       Recent Labs     03/11/23  0243   *   K 4.5   CREATININE 1.0   GLUCOSE 154*       No results for input(s): POCGLU in the last 72 hours. XR HIP 2-3 VW W PELVIS RIGHT   Final Result   Post right total hip arthroplasty. FLUORO FOR SURGICAL PROCEDURES    (Results Pending)       3/9/23 surgical cultures- NGTD  Assessment:     Status Post right femoral revision for painful loose femoral component after right cemented Total Hip Arthroplasty in 2019. Doing well postop without complications . Plan:     Pain control  Keep accuchecks under 200  PT/OT  DVT prophylaxis  Ice and elevate  Discharge To a non[facility] facility this week or early next week.

## 2023-03-12 LAB
HCT VFR BLD CALC: 30.9 % (ref 42–52)
HEMOGLOBIN: 10.4 G/DL (ref 14–18)

## 2023-03-12 PROCEDURE — 97116 GAIT TRAINING THERAPY: CPT

## 2023-03-12 PROCEDURE — 1210000000 HC MED SURG R&B

## 2023-03-12 PROCEDURE — 2580000003 HC RX 258: Performed by: ORTHOPAEDIC SURGERY

## 2023-03-12 PROCEDURE — 85014 HEMATOCRIT: CPT

## 2023-03-12 PROCEDURE — 36415 COLL VENOUS BLD VENIPUNCTURE: CPT

## 2023-03-12 PROCEDURE — 85018 HEMOGLOBIN: CPT

## 2023-03-12 PROCEDURE — 94760 N-INVAS EAR/PLS OXIMETRY 1: CPT

## 2023-03-12 PROCEDURE — 6370000000 HC RX 637 (ALT 250 FOR IP): Performed by: ORTHOPAEDIC SURGERY

## 2023-03-12 RX ADMIN — ACETAMINOPHEN 650 MG: 325 TABLET ORAL at 05:07

## 2023-03-12 RX ADMIN — BRIMONIDINE TARTRATE 1 DROP: 2 SOLUTION OPHTHALMIC at 21:13

## 2023-03-12 RX ADMIN — PANTOPRAZOLE SODIUM 40 MG: 40 TABLET, DELAYED RELEASE ORAL at 05:07

## 2023-03-12 RX ADMIN — ROPINIROLE HYDROCHLORIDE 0.5 MG: 0.5 TABLET, FILM COATED ORAL at 21:14

## 2023-03-12 RX ADMIN — TESTOSTERONE 20.25 MG: 16.2 GEL TRANSDERMAL at 08:56

## 2023-03-12 RX ADMIN — BRIMONIDINE TARTRATE 1 DROP: 2 SOLUTION OPHTHALMIC at 08:58

## 2023-03-12 RX ADMIN — ASPIRIN 325 MG: 325 TABLET, COATED ORAL at 08:57

## 2023-03-12 RX ADMIN — MULTIPLE VITAMINS W/ MINERALS TAB 1 TABLET: TAB at 08:57

## 2023-03-12 RX ADMIN — ATORVASTATIN CALCIUM 20 MG: 20 TABLET, FILM COATED ORAL at 21:14

## 2023-03-12 RX ADMIN — DOCUSATE SODIUM 100 MG: 100 CAPSULE, LIQUID FILLED ORAL at 21:14

## 2023-03-12 RX ADMIN — ASPIRIN 325 MG: 325 TABLET, COATED ORAL at 21:14

## 2023-03-12 RX ADMIN — SODIUM CHLORIDE, PRESERVATIVE FREE 10 ML: 5 INJECTION INTRAVENOUS at 21:14

## 2023-03-12 RX ADMIN — ACETAMINOPHEN 650 MG: 325 TABLET ORAL at 08:56

## 2023-03-12 RX ADMIN — LATANOPROST 1 DROP: 50 SOLUTION OPHTHALMIC at 21:13

## 2023-03-12 RX ADMIN — DOCUSATE SODIUM 100 MG: 100 CAPSULE, LIQUID FILLED ORAL at 08:57

## 2023-03-12 RX ADMIN — ASPIRIN 81 MG: 81 TABLET, COATED ORAL at 08:58

## 2023-03-12 RX ADMIN — MELOXICAM 7.5 MG: 7.5 TABLET ORAL at 08:57

## 2023-03-12 RX ADMIN — ACETAMINOPHEN 650 MG: 325 TABLET ORAL at 21:14

## 2023-03-12 RX ADMIN — TAMSULOSIN HYDROCHLORIDE 0.4 MG: 0.4 CAPSULE ORAL at 21:14

## 2023-03-12 RX ADMIN — SODIUM CHLORIDE, PRESERVATIVE FREE 10 ML: 5 INJECTION INTRAVENOUS at 09:01

## 2023-03-12 ASSESSMENT — PAIN SCALES - GENERAL
PAINLEVEL_OUTOF10: 4
PAINLEVEL_OUTOF10: 2

## 2023-03-12 ASSESSMENT — PAIN DESCRIPTION - LOCATION
LOCATION: HIP
LOCATION: HIP;INCISION

## 2023-03-12 ASSESSMENT — PAIN DESCRIPTION - DESCRIPTORS
DESCRIPTORS: DISCOMFORT;SORE
DESCRIPTORS: THROBBING;TENDER

## 2023-03-12 ASSESSMENT — PAIN DESCRIPTION - ORIENTATION
ORIENTATION: RIGHT
ORIENTATION: RIGHT

## 2023-03-12 NOTE — PROGRESS NOTES
Chief Complaint: Hip pain      Interval History:     Pain controlled. Eating well. He is getting up to the restroom now for and feels like he is going have a bowel movement    Review of Systems:   General ROS: no chills or fever  Respiratory ROS: no cough, shortness of breath, or wheezing  Cardiovascular ROS: no chest pain or dyspnea on exertion  Gastrointestinal ROS: no abdominal pain, diarrhea or nausea/vomiting       Vitals: /65   Pulse 65   Temp 98.2 °F (36.8 °C) (Temporal)   Resp 18   Ht 5' 5\" (1.651 m)   Wt 177 lb (80.3 kg)   SpO2 97%   BMI 29.45 kg/m²   24 hour intake/output:  Intake/Output Summary (Last 24 hours) at 3/12/2023 1247  Last data filed at 3/12/2023 1121  Gross per 24 hour   Intake 2040 ml   Output 1025 ml   Net 1015 ml       Last 3 weights: Wt Readings from Last 3 Encounters:   03/09/23 177 lb (80.3 kg)   02/24/23 177 lb (80.3 kg)         Physical Exam:     General Appearance:    Alert, cooperative, in no acute distress  Head:    N/A  Throat:   N/A  Neck:   N/A  Lungs:   Clear to auscultation,respirations regular, even and unlabored  Heart:    Regular rhythm and normal rate, normal S1 and S2, no murmur, no gallop  Abdomen:     Normal bowel sounds, no masses, no organomegaly, soft, non-tender,   non-distended, no guarding, no rebound      Extremities:   No edema, no cyanosis, no clubbing  Pulses:   N/A  Skin:   N/A  Lymph nodes:   N/A  Neurologic:   N/A         Results Review:      Lab:  Recent Results (from the past 24 hour(s))   Hemoglobin and hematocrit, blood    Collection Time: 03/12/23  3:15 AM   Result Value Ref Range    Hemoglobin 10.4 (L) 14.0 - 18.0 g/dL    Hematocrit 30.9 (L) 42.0 - 52.0 %        Imaging:  Imaging study reports reviewed      ASSESSMENT:    Principal Problem:    Femoral loosening of prosthetic right hip[facility]  Resolved Problems:    * No resolved hospital problems. *      PLAN:    Overall doing well. Continue current medications. If no BM today would add additional medication      Pasha Brito MD  03/12/23  12:47 PM

## 2023-03-12 NOTE — PROGRESS NOTES
Subjective:     Post-Operative Day: 3 No complaints    Objective:     Patient Vitals for the past 24 hrs:   BP Temp Temp src Pulse Resp SpO2   03/12/23 0708 -- -- -- -- -- 93 %   03/12/23 0509 136/63 97.2 °F (36.2 °C) Temporal 74 18 96 %   03/11/23 1942 (!) 140/55 98.8 °F (37.1 °C) Temporal 69 14 93 %   03/11/23 1639 (!) 128/53 97.9 °F (36.6 °C) Temporal 78 16 93 %   03/11/23 0849 -- -- -- -- -- 94 %         General: Alert cooperative. Hard of hearing   Wound: Clean dry intact. Moderate swelling   Neurovascular: Exam normal   DVT Exam: negative         Data Review:  Recent Labs     03/11/23  0243 03/12/23  0315   HGB 10.5* 10.4*       Recent Labs     03/11/23  0243   *   K 4.5   CREATININE 1.0   GLUCOSE 154*       No results for input(s): POCGLU in the last 72 hours. XR HIP 2-3 VW W PELVIS RIGHT   Final Result   Post right total hip arthroplasty. FLUORO FOR SURGICAL PROCEDURES    (Results Pending)       3/9/23 surgical cultures- NGTD  Assessment:     Status Post right femoral revision for painful loose femoral component after right cemented Total Hip Arthroplasty in 2019. Doing well postop without complications .    Plan:     Pain control  Keep accuchecks under 200  PT/OT  DVT prophylaxis  Ice and elevate  Discharge To a non[facility] facility tomorrow

## 2023-03-12 NOTE — PROGRESS NOTES
03/12/23 1200   Subjective   Subjective I think I need to have a BM.  (PHP)   Vitals   O2 Device None (Room air)   Bed Mobility Training   Bed Mobility Training Yes   Supine to Sit Modified independent   Transfer Training   Transfer Training Yes   Sit to Stand Contact-guard assistance   Stand to Sit Contact-guard assistance   Gait Training   Gait Training Yes   Gait   Distance (ft) 10 Feet   Assistive Device Walker, rolling   Weight Bearing   Weight Bearing Technique Yes   Response To Weight Bearing Technique Patient maintained TTWB R LE. Other Specialty Interventions   Other Treatments/Modalities Patient in BR instructed to use call light when done.      Electronically signed by Johnnie Silverman PTA on 3/12/2023 at 12:40 PM

## 2023-03-12 NOTE — PLAN OF CARE
Problem: Discharge Planning  Goal: Discharge to home or other facility with appropriate resources  3/12/2023 1024 by Ricardo Nova RN  Outcome: Progressing  3/11/2023 2215 by Terence Calvert RN  Outcome: Progressing     Problem: Pain  Goal: Verbalizes/displays adequate comfort level or baseline comfort level  3/12/2023 1024 by Ricardo Nova RN  Outcome: Progressing  3/11/2023 2215 by Terence Calvert RN  Outcome: Progressing     Problem: Safety - Adult  Goal: Free from fall injury  3/12/2023 1024 by Ricardo Nova RN  Outcome: Progressing  3/11/2023 2215 by Terence Calvert RN  Outcome: Progressing     Problem: ABCDS Injury Assessment  Goal: Absence of physical injury  3/12/2023 1024 by Ricardo Nova RN  Outcome: Progressing  3/11/2023 2215 by Terence Calvert RN  Outcome: Progressing

## 2023-03-13 LAB
ANAEROBIC CULTURE: NORMAL
CULTURE SURGICAL: NORMAL
GRAM STAIN RESULT: NORMAL

## 2023-03-13 PROCEDURE — 2580000003 HC RX 258: Performed by: ORTHOPAEDIC SURGERY

## 2023-03-13 PROCEDURE — 94760 N-INVAS EAR/PLS OXIMETRY 1: CPT

## 2023-03-13 PROCEDURE — 97535 SELF CARE MNGMENT TRAINING: CPT

## 2023-03-13 PROCEDURE — 1210000000 HC MED SURG R&B

## 2023-03-13 PROCEDURE — 6370000000 HC RX 637 (ALT 250 FOR IP): Performed by: ORTHOPAEDIC SURGERY

## 2023-03-13 PROCEDURE — 6370000000 HC RX 637 (ALT 250 FOR IP): Performed by: PHYSICIAN ASSISTANT

## 2023-03-13 PROCEDURE — 97530 THERAPEUTIC ACTIVITIES: CPT

## 2023-03-13 PROCEDURE — 97116 GAIT TRAINING THERAPY: CPT

## 2023-03-13 RX ORDER — BISACODYL 10 MG
10 SUPPOSITORY, RECTAL RECTAL ONCE
Status: COMPLETED | OUTPATIENT
Start: 2023-03-13 | End: 2023-03-13

## 2023-03-13 RX ORDER — FERROUS SULFATE 325(65) MG
325 TABLET ORAL 2 TIMES DAILY WITH MEALS
Status: DISCONTINUED | OUTPATIENT
Start: 2023-03-13 | End: 2023-03-14 | Stop reason: HOSPADM

## 2023-03-13 RX ORDER — ASPIRIN 81 MG/1
81 TABLET ORAL 2 TIMES DAILY
Qty: 60 TABLET | Refills: 0 | Status: SHIPPED | OUTPATIENT
Start: 2023-03-13

## 2023-03-13 RX ORDER — OXYCODONE HYDROCHLORIDE 5 MG/1
5 TABLET ORAL EVERY 4 HOURS PRN
Qty: 40 TABLET | Refills: 0 | Status: SHIPPED | OUTPATIENT
Start: 2023-03-13 | End: 2023-03-16

## 2023-03-13 RX ADMIN — SODIUM CHLORIDE, PRESERVATIVE FREE 10 ML: 5 INJECTION INTRAVENOUS at 08:50

## 2023-03-13 RX ADMIN — BRIMONIDINE TARTRATE 1 DROP: 2 SOLUTION OPHTHALMIC at 08:57

## 2023-03-13 RX ADMIN — MULTIPLE VITAMINS W/ MINERALS TAB 1 TABLET: TAB at 08:49

## 2023-03-13 RX ADMIN — MELOXICAM 7.5 MG: 7.5 TABLET ORAL at 08:50

## 2023-03-13 RX ADMIN — ASPIRIN 325 MG: 325 TABLET, COATED ORAL at 08:50

## 2023-03-13 RX ADMIN — TAMSULOSIN HYDROCHLORIDE 0.4 MG: 0.4 CAPSULE ORAL at 21:23

## 2023-03-13 RX ADMIN — TESTOSTERONE 20.25 MG: 16.2 GEL TRANSDERMAL at 08:56

## 2023-03-13 RX ADMIN — SODIUM CHLORIDE, PRESERVATIVE FREE 10 ML: 5 INJECTION INTRAVENOUS at 21:23

## 2023-03-13 RX ADMIN — PANTOPRAZOLE SODIUM 40 MG: 40 TABLET, DELAYED RELEASE ORAL at 06:23

## 2023-03-13 RX ADMIN — DOCUSATE SODIUM 100 MG: 100 CAPSULE, LIQUID FILLED ORAL at 21:23

## 2023-03-13 RX ADMIN — BISACODYL 10 MG: 10 SUPPOSITORY RECTAL at 08:49

## 2023-03-13 RX ADMIN — DOCUSATE SODIUM 100 MG: 100 CAPSULE, LIQUID FILLED ORAL at 11:08

## 2023-03-13 RX ADMIN — FERROUS SULFATE TAB 325 MG (65 MG ELEMENTAL FE) 325 MG: 325 (65 FE) TAB at 17:19

## 2023-03-13 RX ADMIN — ACETAMINOPHEN 650 MG: 325 TABLET ORAL at 04:16

## 2023-03-13 RX ADMIN — BRIMONIDINE TARTRATE 1 DROP: 2 SOLUTION OPHTHALMIC at 21:23

## 2023-03-13 RX ADMIN — ACETAMINOPHEN 650 MG: 325 TABLET ORAL at 17:19

## 2023-03-13 RX ADMIN — ASPIRIN 325 MG: 325 TABLET, COATED ORAL at 21:23

## 2023-03-13 RX ADMIN — ROPINIROLE HYDROCHLORIDE 0.5 MG: 0.5 TABLET, FILM COATED ORAL at 21:23

## 2023-03-13 RX ADMIN — LATANOPROST 1 DROP: 50 SOLUTION OPHTHALMIC at 21:23

## 2023-03-13 RX ADMIN — ACETAMINOPHEN 650 MG: 325 TABLET ORAL at 11:08

## 2023-03-13 RX ADMIN — ATORVASTATIN CALCIUM 20 MG: 20 TABLET, FILM COATED ORAL at 21:23

## 2023-03-13 RX ADMIN — FERROUS SULFATE TAB 325 MG (65 MG ELEMENTAL FE) 325 MG: 325 (65 FE) TAB at 08:50

## 2023-03-13 RX ADMIN — ACETAMINOPHEN 650 MG: 325 TABLET ORAL at 21:23

## 2023-03-13 ASSESSMENT — PAIN DESCRIPTION - LOCATION
LOCATION: HIP
LOCATION: HIP

## 2023-03-13 ASSESSMENT — PAIN SCALES - GENERAL
PAINLEVEL_OUTOF10: 3
PAINLEVEL_OUTOF10: 0
PAINLEVEL_OUTOF10: 2
PAINLEVEL_OUTOF10: 0
PAINLEVEL_OUTOF10: 0

## 2023-03-13 ASSESSMENT — PAIN DESCRIPTION - FREQUENCY: FREQUENCY: OTHER (COMMENT)

## 2023-03-13 ASSESSMENT — PAIN DESCRIPTION - DESCRIPTORS: DESCRIPTORS: DISCOMFORT

## 2023-03-13 ASSESSMENT — PAIN DESCRIPTION - PAIN TYPE: TYPE: SURGICAL PAIN

## 2023-03-13 ASSESSMENT — PAIN DESCRIPTION - ORIENTATION
ORIENTATION: RIGHT
ORIENTATION: RIGHT

## 2023-03-13 ASSESSMENT — PAIN - FUNCTIONAL ASSESSMENT: PAIN_FUNCTIONAL_ASSESSMENT: ACTIVITIES ARE NOT PREVENTED

## 2023-03-13 NOTE — CARE COORDINATION
REID reached out to admissions at [facility] to see if there was an update on the status of the referral.   Awaiting call back at this time.   Kim  [phone] P  [phone] F  Electronically signed by Giovanny Recinos on 3/13/2023 at 8:38 AM

## 2023-03-13 NOTE — PROGRESS NOTES
Physical Therapy  Name: Quintin Espino  MRN:  [id number]  Date of service:  3/13/2023     03/13/23 1043   Subjective   Subjective Attempt: Pt up in recliner, declines mobility at this time.          Electronically signed by Monica Dolan PTA on 3/13/2023 at 10:43 AM

## 2023-03-13 NOTE — CARE COORDINATION
REID is still trying to reach someone with the [facility] clinic to get the [address] to cover short term rehab placement at [facility]. REID left multiple messages and then was on hold for over an hour, only to be told the clinic workers leave at 4:00. REID left another message with the call center.  Will try again first thing in the AM.  Electronically signed by Gloria Deluna on 3/13/2023 at 4:30 PM

## 2023-03-13 NOTE — PROGRESS NOTES
Subjective:     Post-Operative Day: 4 No complaints. No bm    Objective:     Patient Vitals for the past 24 hrs:   BP Temp Temp src Pulse Resp SpO2   03/13/23 0416 131/66 98.2 °F (36.8 °C) Temporal 69 16 94 %   03/12/23 1954 (!) 153/61 98.8 °F (37.1 °C) Temporal 65 17 97 %   03/12/23 1710 (!) 144/54 98.8 °F (37.1 °C) Temporal 70 -- 95 %   03/12/23 0820 135/65 98.2 °F (36.8 °C) Temporal 65 -- 97 %         General: Alert cooperative. Hard of hearing   Wound: Clean dry intact. Moderate swelling   Neurovascular: Exam normal   DVT Exam: negative         Data Review:  Recent Labs     03/11/23  0243 03/12/23  0315   HGB 10.5* 10.4*       Recent Labs     03/11/23  0243   *   K 4.5   CREATININE 1.0   GLUCOSE 154*       No results for input(s): POCGLU in the last 72 hours. XR HIP 2-3 VW W PELVIS RIGHT   Final Result   Post right total hip arthroplasty. FLUORO FOR SURGICAL PROCEDURES    (Results Pending)       3/9/23 surgical cultures- NGTD  Assessment:     Status Post right femoral revision for painful loose femoral component after right cemented Total Hip Arthroplasty in 2019. Doing well postop without complications .    Plan:   Tdwb x 3 weeks  Pain control  Keep accuchecks under 200  PT/OT  DVT prophylaxis  Ice and elevate  [facility]charge To a [facility] facility today

## 2023-03-13 NOTE — DISCHARGE SUMMARY
[facility]  Dr. Meagan Trejo  Discharge Summary      The Rock Green is a 76 y.o. male underwent revision femoral total hip replacement procedure without complication. Rock Green was admitted to the floor following their recovery in the PACU.      Discharge Diagnosis  right Hip Replacement pain due to loose femoral component    Current Inpatient Medications    Current Facility-Administered Medications: bisacodyl (DULCOLAX) suppository 10 mg, 10 mg, Rectal, Once  ferrous sulfate (IRON 325) tablet 325 mg, 325 mg, Oral, BID WC  meloxicam (MOBIC) tablet 7.5 mg, 7.5 mg, Oral, Daily  rOPINIRole (REQUIP) tablet 0.5 mg, 0.5 mg, Oral, Nightly  Testosterone (ANDROGEL) 20.25 MG/ACT (1.62%) gel GEL 20.25 mg  (Patient Supplied), 1 actuation, TransDERmal, Daily  brimonidine (ALPHAGAN) 0.2 % ophthalmic solution 1 drop, 1 drop, Both Eyes, BID  latanoprost (XALATAN) 0.005 % ophthalmic solution 1 drop, 1 drop, Both Eyes, Nightly  atorvastatin (LIPITOR) tablet 20 mg, 20 mg, Oral, Nightly  therapeutic multivitamin-minerals 1 tablet, 1 tablet, Oral, Daily  tamsulosin (FLOMAX) capsule 0.4 mg, 0.4 mg, Oral, Nightly  docusate sodium (COLACE) capsule 100 mg, 100 mg, Oral, BID  pantoprazole (PROTONIX) tablet 40 mg, 40 mg, Oral, QAM AC  sodium chloride flush 0.9 % injection 5-40 mL, 5-40 mL, IntraVENous, 2 times per day  sodium chloride flush 0.9 % injection 5-40 mL, 5-40 mL, IntraVENous, PRN  0.9 % sodium chloride infusion, , IntraVENous, PRN  acetaminophen (TYLENOL) tablet 650 mg, 650 mg, Oral, Q6H  oxyCODONE (ROXICODONE) immediate release tablet 5 mg, 5 mg, Oral, Q4H PRN **OR** oxyCODONE (ROXICODONE) immediate release tablet 10 mg, 10 mg, Oral, Q4H PRN  morphine (PF) injection 2 mg, 2 mg, IntraVENous, Q2H PRN **OR** morphine sulfate (PF) injection 4 mg, 4 mg, IntraVENous, Q2H PRN  aspirin EC tablet 325 mg, 325 mg, Oral, BID    Post-operatively the patients diet was advanced as tolerated and their incision was checked on POD #1. The incision is was clean, dry and intact with no signs of infection. The patient remained neurovascularly intact in the lower extremity and had intact pulses distally. Patients calf remained soft and showed no evidence of DVT. The patient was able to move their leg and ankle/foot without any problems post-operatively. Physical therapy and occupational therapy were consulted and began working with the patient post-operatively. The patient progressed with PT/OT as would be expected and continued to improve through their stay. The patients pain was initially controlled with IV medications but we were able to transition to oral pain medications soon after arrival to the floor and their pain remained under good control through their hospital stay. From a medical standpoint the patient remained stable and continued to have the medicine team follow throughout their stay. Acute postoperative blood loss anemia after joint replacement being monitored with daily hemoglobin/hematocrit. The patients dressing was changed/incision was checked on day of d/c. The patient will be discharged at this time to  To a non-[facility] facility with their current diet restrictions and will continue to follow the hip precautions outlined to them by us and PT/OT. Toe touch weight bear x 3 weeks. Condition on Discharge: Stable    Plan  Followup at scheduled appointment time (1 month post-op). Patient was instructed on the use of pain medications, the signs and symptoms of infection, and was given our number to call should they have any questions or concerns following discharge.

## 2023-03-13 NOTE — PROGRESS NOTES
[facility]  Daily Progress Note  Angel Cruz  MRN: [id number] LOS: 4    Admit Date: 3/9/2023   3/13/2023 6:42 AM          Chief Complaint:  Right hip pain      Interval History:    Reviewed overnight events and nursing notes. Postoperative pain controlled  Denies chest pain  No shortness of breath  Appetite stable  Positive flatus, no BM    DIET:  ADULT DIET; Regular    Medications:      sodium chloride        meloxicam  7.5 mg Oral Daily    aspirin  81 mg Oral Daily    rOPINIRole  0.5 mg Oral Nightly    Testosterone  1 actuation TransDERmal Daily    brimonidine  1 drop Both Eyes BID    latanoprost  1 drop Both Eyes Nightly    atorvastatin  20 mg Oral Nightly    therapeutic multivitamin-minerals  1 tablet Oral Daily    tamsulosin  0.4 mg Oral Nightly    docusate sodium  100 mg Oral BID    pantoprazole  40 mg Oral QAM AC    sodium chloride flush  5-40 mL IntraVENous 2 times per day    acetaminophen  650 mg Oral Q6H    aspirin  325 mg Oral BID       Data:     Code Status: Full Code    History reviewed. No pertinent family history. Social History     Socioeconomic History    Marital status:      Spouse name: Not on file    Number of children: Not on file    Years of education: Not on file    Highest education level: Not on file   Occupational History    Not on file   Tobacco Use    Smoking status: Former     Types: Cigars, Pipe    Smokeless tobacco: Never    Tobacco comments:     Hx of occ cigar and pipe, former at this point   Substance and Sexual Activity    Alcohol use:  Yes     Alcohol/week: 14.0 standard drinks     Types: 2 Glasses of wine, 12 Cans of beer per week    Drug use: Not on file    Sexual activity: Not on file   Other Topics Concern    Not on file   Social History Narrative    Not on file     Social Determinants of Health     Financial Resource Strain: Not on file   Food Insecurity: Not on file   Transportation Needs: Not on file   Physical Activity: Not on file   Stress: Not on file   Social Connections: Not on file   Intimate Partner Violence: Not on file   Housing Stability: Not on file       Labs:  Hematology:  Recent Labs     23  0243 23  0315   HGB 10.5* 10.4*   HCT 31.0* 30.9*     Chemistry:  Recent Labs     23  0243   *   K 4.5      CO2 25   GLUCOSE 154*   BUN 18   CREATININE 1.0   ANIONGAP 8   LABGLOM >60   CALCIUM 8.1*     No results for input(s): PROT, LABALBU, LABA1C, A3UPROU, Y2LTVXH, FT4, TSH, AST, ALT, LDH, GGT, ALKPHOS, BILITOT, BILIDIR, AMMONIA, AMYLASE, LIPASE, LACTATE, CHOL, HDL, LDLCHOLESTEROL, CHOLHDLRATIO, TRIG, VLDL, PHENYTOIN, PHENYF in the last 72 hours. Objective:     Vitals: /66   Pulse 69   Temp 98.2 °F (36.8 °C) (Temporal)   Resp 16   Ht 5' 5\" (1.651 m)   Wt 177 lb (80.3 kg)   SpO2 94%   BMI 29.45 kg/m²    Intake/Output Summary (Last 24 hours) at 3/13/2023 0028  Last data filed at 3/13/2023 0221  Gross per 24 hour   Intake 1420 ml   Output 1575 ml   Net -155 ml    Temp (24hrs), Av.5 °F (36.9 °C), Min:98.2 °F (36.8 °C), Max:98.8 °F (37.1 °C)    Glucose:  No results for input(s): POCGLU in the last 72 hours.   Physical Examination:   General appearance - alert, well appearing, and in no distress and oriented to person, place, and time  Mouth - mucous membranes moist, pharynx normal without lesions  Neck - supple, no significant adenopathy  Lymphatics - no palpable lymphadenopathy, no hepatosplenomegaly  Chest - clear to auscultation, no wheezes, rales or rhonchi, symmetric air entry, no tachypnea, retractions or cyanosis  Heart - normal rate, regular rhythm, normal S1, S2, no murmurs, rubs, clicks or gallops  Abdomen - soft, nontender, nondistended, no masses or organomegaly bowel sounds normal  Neurological - alert, oriented, normal speech, no focal findings or movement disorder noted  Musculoskeletal - no joint tenderness, deformity or swelling  Extremities - peripheral pulses normal, no pedal edema, no clubbing or cyanosis  Skin - normal coloration and turgor, no rashes, no suspicious skin lesions noted      Assessment and Plan:     Primary Problem:  Femoral loosening of prosthetic right hip, initial encounter [facility])    Hospital Problem list/ Treatment recommendations:  Right hip pain SP right hip revision--postop care  Slow transit constipation--bowel regimen, Dulcolax suppository x1  GERD--Protonix, antireflux measures   Testicular hypofunction--TRT  Postop low Hgb--stable, expected, being monitored with daily labs, Hgb 10.4, orders for iron replacement  VTE prophylaxis--ASA  BPH--Flomax    Doing excellent postop day #4  Encourage I-S every hour while awake  Continue maximize efforts with PT   Follow with daily labs  Medically stable for discharge to SNF, OP follow-up with PCP Roas Caldera [address] [place] [address] upon discharge    Reviewed treatment plans with the patient and nursing staff  20 minutes spent in face to face interaction and coordination of care. Electronically signed by Clarisa Anderson PA-C on 3/13/2023 at 6:42 AM     Medically stable for discharge to skilled nursing facility. Follow-up with his nurse practitioner upon discharge from [facility]. We were asked to provide medical consultation by the attending physician during the perioperative phase. They will return to their primary care provider and have been instructed to follow up with them concerning abnormal lab/x-rays. Questions were encouraged and answered to the best of our ability. We will be available for 30 days or until they return to their primary care provider, if they return to the emergency room in the next 30 days with a catalina-operative issue we will gladly admit them. Otherwise, they will be admitted to the hospitalist service. All questions and concerns addressed prior to discharge. I have discussed the care of Jennifer Vuong, including pertinent history and exam findings with the ARNP/PA.   I have seen and examined the patient and the key elements of all parts of the encounter have been performed by me. I agree with the assessment and plan as outlined by the ARNP/PA. Please refer to my separate note for complete documentation.      Electronically signed by Harris Kirkpatrick MD on 3/13/2023 at 8:01 AM

## 2023-03-13 NOTE — PROGRESS NOTES
Occupational Therapy     03/13/23 1000   Subjective   Subjective Pt in bed upon arrival for therapy. Pt agreeable to participate. Pain Assessment   Pain Assessment 0-10   Pain Level 3   Pain Location Hip   Pain Orientation Right   Pain Descriptors Discomfort   Functional Pain Assessment Activities are not prevented   Pain Type Surgical pain   Pain Frequency Other (Comment)  (with movement.)   Non-Pharmaceutical Pain Intervention(s) Ambulation/Increased Activity;Repositioned; Rest   Response to Pain Intervention Patient satisfied   Cognition   Overall Cognitive Status WFL   Cognition Comment awake, alert, following 1-step commands, [employer] [place]   Orientation   Overall Orientation Status WF   Bed Mobility Training   Bed Mobility Training Yes   Overall Level of Assistance Minimum assistance   Interventions Verbal cues; Tactile cues;Manual cues   Supine to Sit Minimum assistance   Scooting Minimum assistance;Contact-guard assistance   Transfer Training   Transfer Training Yes   Overall Level of Assistance Contact-guard assistance   Interventions Verbal cues; Tactile cues   Sit to Stand Contact-guard assistance   Stand to Sit Contact-guard assistance   Bed to Chair Contact-guard assistance   Toilet Transfer Contact-guard assistance   Balance   Sitting Intact   Standing With support  (RW)   ADL   Feeding Independent;Setup   Grooming Independent;Setup   UE Bathing Independent;Setup   LE Bathing Moderate assistance   UE Dressing Setup   LE Dressing Moderate assistance   Toileting Contact guard assistance   Assessment   Assessment Tx focused on functional mobility from bed to bathroom at RW level. Pt instructed on clean up and gown change sitting on toilet with Set-up, SBA. Pt agreeable to sit up in recliner. Nurse gave suppository during treatment. Call light left in reach.    Activity Tolerance Patient tolerated treatment well   Discharge Recommendations Patient would benefit from continued therapy after discharge   Occupational Therapy Plan   Times Per Week 3-5   Times Per Day Once a day   OT Plan of Care   Monday X   Electronically signed by DILAN Parisi on 3/13/2023 at 10:30 AM

## 2023-03-13 NOTE — PROGRESS NOTES
Physical Therapy  Name: Casper Torres  MRN:  [id number]  Date of service:  3/13/2023     03/13/23 1426   Restrictions/Precautions   Restrictions/Precautions Fall Risk;Weight Bearing   Lower Extremity Weight Bearing Restrictions   Right Lower Extremity Weight Bearing Toe Touch Weight Bearing   Position Activity Restriction   Hip Precautions Posterior hip precautions   General   Chart Reviewed Yes   Subjective   Subjective Pt up in recliner, agrees to walk. Pain Assessment   Pain Assessment None - Denies Pain   Transfers   Sit to Stand Contact guard assistance   Stand to Sit Contact guard assistance   Ambulation   WB Status TTWB   Ambulation   Surface Level tile   Device Rolling Walker   Assistance Contact guard assistance   Distance 20', 10'   Comments did well with TTWB   Other Activities   Comment assisted pt to/from the BR, able to perform toileting and hygiene tasks with CGA   Short Term Goals   Time Frame for Short Term Goals 14 days   Short Term Goal 1 amb 48' with RW while maintaining WB status, SBA   Short Term Goal 2 sit <> stand, SBA w/o verbal cues for WB status   Short Term Goal 3 bed mobility, SBA   Conditions Requiring Skilled Therapeutic Intervention   Body Structures, Functions, Activity Limitations Requiring Skilled Therapeutic Intervention Decreased functional mobility ; Decreased ADL status; Decreased ROM; Decreased strength;Decreased endurance;Decreased safe awareness   Assessment Pt did well with short amb distance in room and into BR, able to maintain TTWB with rwx. Pt returned to recliner following tx. Activity Tolerance   Activity Tolerance Patient tolerated treatment well   PT Plan of Care   Monday X   Safety Devices   Type of Devices Call light within reach; Chair alarm in place;Gait belt;Left in chair         Electronically signed by Tania Morales PTA on 3/13/2023 at 2:30 PM

## 2023-03-14 VITALS
BODY MASS INDEX: 29.49 KG/M2 | RESPIRATION RATE: 16 BRPM | DIASTOLIC BLOOD PRESSURE: 70 MMHG | TEMPERATURE: 98.1 F | OXYGEN SATURATION: 96 % | HEIGHT: 65 IN | WEIGHT: 177 LBS | SYSTOLIC BLOOD PRESSURE: 126 MMHG | HEART RATE: 68 BPM

## 2023-03-14 LAB — SARS-COV-2 RDRP RESP QL NAA+PROBE: NOT DETECTED

## 2023-03-14 PROCEDURE — 97116 GAIT TRAINING THERAPY: CPT

## 2023-03-14 PROCEDURE — 97530 THERAPEUTIC ACTIVITIES: CPT

## 2023-03-14 PROCEDURE — 97535 SELF CARE MNGMENT TRAINING: CPT

## 2023-03-14 PROCEDURE — 2580000003 HC RX 258: Performed by: ORTHOPAEDIC SURGERY

## 2023-03-14 PROCEDURE — 87635 SARS-COV-2 COVID-19 AMP PRB: CPT

## 2023-03-14 PROCEDURE — 6370000000 HC RX 637 (ALT 250 FOR IP): Performed by: ORTHOPAEDIC SURGERY

## 2023-03-14 PROCEDURE — 6370000000 HC RX 637 (ALT 250 FOR IP): Performed by: PHYSICIAN ASSISTANT

## 2023-03-14 RX ADMIN — MELOXICAM 7.5 MG: 7.5 TABLET ORAL at 08:03

## 2023-03-14 RX ADMIN — SODIUM CHLORIDE, PRESERVATIVE FREE 10 ML: 5 INJECTION INTRAVENOUS at 08:08

## 2023-03-14 RX ADMIN — DOCUSATE SODIUM 100 MG: 100 CAPSULE, LIQUID FILLED ORAL at 08:03

## 2023-03-14 RX ADMIN — ASPIRIN 325 MG: 325 TABLET, COATED ORAL at 08:03

## 2023-03-14 RX ADMIN — MULTIPLE VITAMINS W/ MINERALS TAB 1 TABLET: TAB at 08:03

## 2023-03-14 RX ADMIN — TESTOSTERONE 20.25 MG: 16.2 GEL TRANSDERMAL at 08:03

## 2023-03-14 RX ADMIN — ACETAMINOPHEN 650 MG: 325 TABLET ORAL at 03:59

## 2023-03-14 RX ADMIN — BRIMONIDINE TARTRATE 1 DROP: 2 SOLUTION OPHTHALMIC at 08:04

## 2023-03-14 RX ADMIN — PANTOPRAZOLE SODIUM 40 MG: 40 TABLET, DELAYED RELEASE ORAL at 05:55

## 2023-03-14 RX ADMIN — FERROUS SULFATE TAB 325 MG (65 MG ELEMENTAL FE) 325 MG: 325 (65 FE) TAB at 08:03

## 2023-03-14 NOTE — PROGRESS NOTES
Physical Therapy  Name: Ana Lilia Márquez  MRN:  [id number]  Date of service:  3/14/2023     03/14/23 1132   Restrictions/Precautions   Restrictions/Precautions Fall Risk;Weight Bearing   Lower Extremity Weight Bearing Restrictions   Right Lower Extremity Weight Bearing Toe Touch Weight Bearing   Position Activity Restriction   Hip Precautions Posterior hip precautions   General   Chart Reviewed Yes   Subjective   Subjective Pt in bed, agrees to walk. Pain Assessment   Pain Assessment None - Denies Pain   Bed Mobility   Supine to Sit Stand by assistance   Transfers   Sit to Stand Stand by assistance   Stand to Sit Stand by assistance   Ambulation   WB Status TTWB   Ambulation   Surface Level tile   Device Rolling Walker   Assistance Contact guard assistance;Stand by assistance   Distance 30', 10'   Other Activities   Comment pt able to stand at the sink for oral hygiene and facial hygiene tasks with CGA for standing balance   Short Term Goals   Time Frame for Short Term Goals 14 days   Short Term Goal 1 amb 48' with RW while maintaining WB status, SBA   Short Term Goal 2 sit <> stand, SBA w/o verbal cues for WB status   Short Term Goal 3 bed mobility, SBA   Conditions Requiring Skilled Therapeutic Intervention   Body Structures, Functions, Activity Limitations Requiring Skilled Therapeutic Intervention Decreased functional mobility ; Decreased ADL status; Decreased ROM; Decreased strength;Decreased endurance;Decreased safe awareness   Assessment Pt cont to do well with overall mobility, able to amb short distance with rwx and maintain TTWB. Pt stood in BR with TTWB for hygiene tasks at the sink with CGA for standing balance. Pt up to recliner with all needs in reach following tx.    Activity Tolerance   Activity Tolerance Patient tolerated treatment well   PT Plan of Care   Tuesday X   Safety Devices   Type of Devices Call light within reach;Gait belt;Left in chair         Electronically signed by Genesis Archer PTA on 3/14/2023 at 11:34 AM

## 2023-03-14 NOTE — PROGRESS NOTES
Patient has been accepted to [facility] all necessary documents faxed to facility with confirmation, transport will be here at 300 pm to transport patient. Iv removed from patients hand no complications and catheter intact covid negative report called to [place]. , patients belongings packed and awaiting transport at this time.

## 2023-03-14 NOTE — PROGRESS NOTES
Subjective:     Post-Operative Day: 5 No complaints. Objective:     Patient Vitals for the past 24 hrs:   BP Temp Temp src Pulse Resp SpO2   03/14/23 0400 (!) 142/54 98.5 °F (36.9 °C) Temporal 72 16 96 %   03/13/23 1922 (!) 158/83 98.4 °F (36.9 °C) Temporal 66 20 97 %   03/13/23 1714 (!) 175/74 98.4 °F (36.9 °C) Temporal 69 16 98 %   03/13/23 1220 (!) 142/56 98.2 °F (36.8 °C) Temporal 63 16 97 %   03/13/23 0842 -- -- -- 68 -- 95 %   03/13/23 0756 125/60 98.4 °F (36.9 °C) -- 67 16 95 %         General: Alert cooperative. Hard of hearing   Wound: Clean dry intact. Moderate swelling   Neurovascular: Exam normal   DVT Exam: negative         Data Review:  Recent Labs     03/12/23  0315   HGB 10.4*       No results for input(s): NA, K, CREATININE, GLUCOSE, INR in the last 72 hours. No results for input(s): POCGLU in the last 72 hours. XR HIP 2-3 VW W PELVIS RIGHT   Final Result   Post right total hip arthroplasty. FLUORO FOR SURGICAL PROCEDURES    (Results Pending)       3/9/23 surgical cultures- NGTD  Assessment:     Status Post right femoral revision for painful loose femoral component after right cemented Total Hip Arthroplasty in 2019. Doing well postop without complications .    Plan:   Tdwb x 3 weeks  Pain control  Keep accuchecks under 200  PT/OT  DVT prophylaxis  Ice and elevate  Discharge To a non-[facility] facility today

## 2023-03-14 NOTE — PROGRESS NOTES
[facility]  Daily Progress Note  Kaleb Aj  MRN: [id number] LOS: 5    Admit Date: 3/9/2023   3/14/2023 7:01 AM          Chief Complaint:  Right hip pain    Interval History:    Reviewed overnight events and nursing notes. Postoperative pain controlled  Denies chest pain  No shortness of breath  Appetite stable  Positive BM per staff    DIET:  ADULT DIET; Regular    Medications:      sodium chloride        ferrous sulfate  325 mg Oral BID WC    meloxicam  7.5 mg Oral Daily    rOPINIRole  0.5 mg Oral Nightly    Testosterone  1 actuation TransDERmal Daily    brimonidine  1 drop Both Eyes BID    latanoprost  1 drop Both Eyes Nightly    atorvastatin  20 mg Oral Nightly    therapeutic multivitamin-minerals  1 tablet Oral Daily    tamsulosin  0.4 mg Oral Nightly    docusate sodium  100 mg Oral BID    pantoprazole  40 mg Oral QAM AC    sodium chloride flush  5-40 mL IntraVENous 2 times per day    acetaminophen  650 mg Oral Q6H    aspirin  325 mg Oral BID       Data:     Code Status: Full Code    History reviewed. No pertinent family history. Social History     Socioeconomic History    Marital status:      Spouse name: Not on file    Number of children: Not on file    Years of education: Not on file    Highest education level: Not on file   Occupational History    Not on file   Tobacco Use    Smoking status: Former     Types: Cigars, Pipe    Smokeless tobacco: Never    Tobacco comments:     Hx of occ cigar and pipe, former at this point   Substance and Sexual Activity    Alcohol use:  Yes     Alcohol/week: 14.0 standard drinks     Types: 2 Glasses of wine, 12 Cans of beer per week    Drug use: Not on file    Sexual activity: Not on file   Other Topics Concern    Not on file   Social History Narrative    Not on file     Social Determinants of Health     Financial Resource Strain: Not on file   Food Insecurity: Not on file   Transportation Needs: Not on file   Physical Activity: Not on file   Stress: Not on file   Social Connections: Not on file   Intimate Partner Violence: Not on file   Housing Stability: Not on file       Labs:  Hematology:  Recent Labs     23  0315   HGB 10.4*   HCT 30.9*       Chemistry:  No results for input(s): NA, K, CL, CO2, GLUCOSE, BUN, CREATININE, MG, ANIONGAP, LABGLOM, GFRAA, CALCIUM, CAION, PHOS, PSA, BNP, TROPONINI, CKTOTAL, CKMB, CKMBINDEX, MYOGLOBIN in the last 72 hours. No results for input(s): PROT, LABALBU, LABA1C, X3TENUT, J6MUMZC, FT4, TSH, AST, ALT, LDH, GGT, ALKPHOS, BILITOT, BILIDIR, AMMONIA, AMYLASE, LIPASE, LACTATE, CHOL, HDL, LDLCHOLESTEROL, CHOLHDLRATIO, TRIG, VLDL, PHENYTOIN, PHENYF in the last 72 hours. Objective:     Vitals: BP (!) 142/54   Pulse 72   Temp 98.5 °F (36.9 °C) (Temporal)   Resp 16   Ht 5' 5\" (1.651 m)   Wt 177 lb (80.3 kg)   SpO2 96%   BMI 29.45 kg/m²    Intake/Output Summary (Last 24 hours) at 3/14/2023 0701  Last data filed at 3/13/2023 0903  Gross per 24 hour   Intake 240 ml   Output --   Net 240 ml      Temp (24hrs), Av.4 °F (36.9 °C), Min:98.2 °F (36.8 °C), Max:98.5 °F (36.9 °C)    Glucose:  No results for input(s): POCGLU in the last 72 hours.   Physical Examination:   General appearance - alert, well appearing, and in no distress and oriented to person, place, and time  Mouth - mucous membranes moist, pharynx normal without lesions  Neck - supple, no significant adenopathy  Lymphatics - no palpable lymphadenopathy, no hepatosplenomegaly  Chest - clear to auscultation, no wheezes, rales or rhonchi, symmetric air entry, no tachypnea, retractions or cyanosis  Heart - normal rate, regular rhythm, normal S1, S2, no murmurs, rubs, clicks or gallops  Abdomen - soft, nontender, nondistended, no masses or organomegaly bowel sounds normal  Neurological - alert, oriented, normal speech, no focal findings or movement disorder noted  Musculoskeletal - no joint tenderness, deformity or swelling  Extremities - peripheral pulses normal, no pedal edema, no clubbing or cyanosis  Skin - normal coloration and turgor, no rashes, no suspicious skin lesions noted      Assessment and Plan:     Primary Problem:  Femoral loosening of prosthetic right hip, initial encounter [facility] Problem list/ Treatment recommendations:  Right hip pain SP right hip revision--postop care  Slow transit constipation--positive BM s/p Dulcolax suppository  GERD--Protonix, antireflux measures   Testicular hypofunction--TRT  Postop low Hgb--stable, expected, being monitored with daily labs, Hgb 10.4, orders for iron replacement  VTE prophylaxis--ASA  BPH--Flomax    Doing excellent postop day #5  Encourage I-S every hour while awake  Continue maximize efforts with PT   Follow with daily labs  Medically stable for discharge to [facility], OP follow-up with PCP Rosa Caldera [address] [place] [address] upon discharge    Reviewed treatment plans with the patient and nursing staff  15 minutes spent in face to face interaction and coordination of care. Electronically signed by Gaurav Ken PA-C on 3/14/2023 at 7:01 AM     Patient medically stable for discharge to skilled nursing facility at [facility]. Close follow-up with the [facility] on discharge. I have discussed the care of Kristel Juan, including pertinent history and exam findings with the ARNP/PA. I have seen and examined the patient and the key elements of all parts of the encounter have been performed by me. I agree with the assessment and plan as outlined by the ARNP/PA. Please refer to my separate note for complete documentation.      Electronically signed by Esther Middleton MD on 3/14/2023 at 7:53 AM

## 2023-03-14 NOTE — DISCHARGE SUMMARY
[facility] [address] [place] [address]  Dr. Reyna Stoner  Discharge Summary      The Tam Oliver is a 76 y.o. male underwent revision femoral total hip replacement procedure without complication. Tam Oliver was admitted to the floor following their recovery in the PACU. Discharge Diagnosis  right Hip Replacement pain due to loose femoral component    Current Inpatient Medications    Current Facility-Administered Medications: ferrous sulfate (IRON 325) tablet 325 mg, 325 mg, Oral, BID WC  meloxicam (MOBIC) tablet 7.5 mg, 7.5 mg, Oral, Daily  rOPINIRole (REQUIP) tablet 0.5 mg, 0.5 mg, Oral, Nightly  Testosterone (ANDROGEL) 20.25 MG/ACT (1.62%) gel GEL 20.25 mg  (Patient Supplied), 1 actuation, TransDERmal, Daily  brimonidine (ALPHAGAN) 0.2 % ophthalmic solution 1 drop, 1 drop, Both Eyes, BID  latanoprost (XALATAN) 0.005 % ophthalmic solution 1 drop, 1 drop, Both Eyes, Nightly  atorvastatin (LIPITOR) tablet 20 mg, 20 mg, Oral, Nightly  therapeutic multivitamin-minerals 1 tablet, 1 tablet, Oral, Daily  tamsulosin (FLOMAX) capsule 0.4 mg, 0.4 mg, Oral, Nightly  docusate sodium (COLACE) capsule 100 mg, 100 mg, Oral, BID  pantoprazole (PROTONIX) tablet 40 mg, 40 mg, Oral, QAM AC  sodium chloride flush 0.9 % injection 5-40 mL, 5-40 mL, IntraVENous, 2 times per day  sodium chloride flush 0.9 % injection 5-40 mL, 5-40 mL, IntraVENous, PRN  0.9 % sodium chloride infusion, , IntraVENous, PRN  acetaminophen (TYLENOL) tablet 650 mg, 650 mg, Oral, Q6H  oxyCODONE (ROXICODONE) immediate release tablet 5 mg, 5 mg, Oral, Q4H PRN **OR** oxyCODONE (ROXICODONE) immediate release tablet 10 mg, 10 mg, Oral, Q4H PRN  morphine (PF) injection 2 mg, 2 mg, IntraVENous, Q2H PRN **OR** morphine sulfate (PF) injection 4 mg, 4 mg, IntraVENous, Q2H PRN  aspirin EC tablet 325 mg, 325 mg, Oral, BID    Post-operatively the patients diet was advanced as tolerated and their incision was checked on POD #1.   The incision is was clean, dry and intact with no signs of infection. The patient remained neurovascularly intact in the lower extremity and had intact pulses distally. Patients calf remained soft and showed no evidence of DVT. The patient was able to move their leg and ankle/foot without any problems post-operatively. Physical therapy and occupational therapy were consulted and began working with the patient post-operatively. The patient progressed with PT/OT as would be expected and continued to improve through their stay. The patients pain was initially controlled with IV medications but we were able to transition to oral pain medications soon after arrival to the floor and their pain remained under good control through their hospital stay. From a medical standpoint the patient remained stable and continued to have the medicine team follow throughout their stay. Acute postoperative blood loss anemia after joint replacement being monitored with daily hemoglobin/hematocrit. The patients dressing was changed/incision was checked on day of d/c. The patient will be discharged at this time to  To a non-[facility] facility with their current diet restrictions and will continue to follow the hip precautions outlined to them by us and PT/OT. Toe touch weight bear x 3 weeks. Condition on Discharge: Stable    Plan  Followup at scheduled appointment time (1 month post-op). Patient was instructed on the use of pain medications, the signs and symptoms of infection, and was given our number to call should they have any questions or concerns following discharge.

## 2023-03-14 NOTE — CARE COORDINATION
[facility] approved and [facility] accepted Pt for short term rehab placement. Pt is able to DC on Tuesday March 14th.  Transport from [place] will be there around 3:00 PM.   [place]   [facility]  Electronically signed by Spike Patino on 3/14/2023 at 1:22 PM

## 2023-03-14 NOTE — PROGRESS NOTES
Occupational Therapy  Facility/Department: [facility] 5 SURG SERVICES  Daily Treatment Note  NAME: Ana Lilia Márquez  : 1945  MRN: [id number]    Date of Service: 3/14/2023    Discharge Recommendations:  Patient would benefit from continued therapy after discharge       Assessment   Performance deficits / Impairments: Decreased functional mobility ; Decreased safe awareness;Decreased balance;Decreased ADL status; Decreased endurance;Decreased high-level IADLs;Decreased strength  Assessment: Pt tolerated tx well. Pt walked into BR and completed toileting and grooming/hygiene while standing at sink. Pt continues to benefit from skilled OT services. 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 A diagnosis of Loosening of prosthetic hip, initial encounter [facility]) was also pertinent to this visit. has a past medical history of Arthritis, COVID-19, ED (erectile dysfunction), GERD (gastroesophageal reflux disease), Hip pain, and Hyperlipidemia. has a past surgical history that includes joint replacement (Right, 2018); Tendon transfer upper arm (Left); hernia repair; Knee arthroscopy (Right); Cardiac catheterization; and Total hip arthroplasty (Right, 3/9/2023).     Restrictions  Restrictions/Precautions  Restrictions/Precautions: Fall Risk, Weight Bearing  Lower Extremity Weight Bearing Restrictions  Right Lower Extremity Weight Bearing: Toe Touch Weight Bearing  Position Activity Restriction  Hip Precautions: Posterior hip precautions  Subjective   General  Chart Reviewed: Yes  Patient assessed for rehabilitation services?: Yes  Response to previous treatment: Patient with no complaints from previous session  Family / Caregiver Present: No  Pre Treatment Pain Screening  Pain at present: 0  Scale Used: Numeric Score  Intervention List: Patient able to continue with treatment   Orientation     Objective    ADL  Grooming: Independent;Setup  Toileting: Stand by assistance        Toilet Transfers  Toilet - Technique: Ambulating  Equipment Used: Grab bars  Toilet Transfer: Contact guard assistance  Bed mobility  Supine to Sit: Stand by assistance  Transfers  Sit to stand: Contact guard assistance  Stand to sit: Contact guard assistance                                                           Plan   Occupational Therapy Plan  Times Per Week: 3-5  Times Per Day: Once a day  Current Treatment Recommendations: Strengthening, Balance training, Functional mobility training, Endurance training, Pain management, Safety education & training, Equipment evaluation, education, & procurement, Patient/Caregiver education & training, Self-Care / ADL, Home management training  Goals  Short Term Goals  Time Frame for Short Term Goals: 2 weeks  Short Term Goal 1: Toilet with mod I  Short Term Goal 2: Toilet transfer with mod I  Short Term Goal 3: LB bathing/dressing with mod I, AE  Short Term Goal 4: Functional standing activities x 5 mins, SBA, maintaining WB precautions  Short Term Goal 5: Pt will demo hip and WB precautions during ADL and functional mobility without cues.        Therapy Time   Individual Concurrent Group Co-treatment   Time In           Time Out           Minutes              DILAN Rhodes  Electronically signed by DILAN Rhodes on 3/14/2023 at 10:57 AM

## 2023-03-14 NOTE — PROGRESS NOTES
Occupational Therapy  Facility/Department: [facility] SURG SERVICES  Daily Treatment Note  NAME: Rock [place]  : 1945  MRN: [id number]    Date of Service: 3/14/2023    Discharge Recommendations:  Patient would benefit from continued therapy after discharge       Assessment   Performance deficits / Impairments: Decreased functional mobility ; Decreased safe awareness;Decreased balance;Decreased ADL status; Decreased endurance;Decreased high-level IADLs;Decreased strength  Assessment: Worked with pt on getting dressed to leave facility. Pt educated on hip precautions and AE for LE dressing. Pt tolerated tx well and continues to benefit from skilled OT services. Prognosis: Good  Activity Tolerance  Activity Tolerance: Patient Tolerated treatment well         Patient Diagnosis(es): The primary encounter diagnosis was Femoral loos[facility] of [facility] right [facility] A diagnosis of Loosening of prosthetic hip, initial encounter [facility]) was also pertinent to this visit. has a past medical history of Arthritis, COVID-19, ED (erectile dysfunction), GERD (gastroesophageal reflux disease), Hip pain, and Hyperlipidemia. has a past surgical history that includes joint replacement (Right, 2018); Tendon transfer upper arm (Left); hernia repair; Knee arthroscopy (Right); Cardiac catheterization; and Total hip arthroplasty (Right, 3/9/2023).     Restrictions  Restrictions/Precautions  Restrictions/Precautions: Fall Risk, Weight Bearing  Lower Extremity Weight Bearing Restrictions  Right Lower Extremity Weight Bearing: Toe Touch Weight Bearing  Position Activity Restriction  Hip Precautions: Posterior hip precautions  Subjective   General  Chart Reviewed: Yes  Patient assessed for rehabilitation services?: Yes  Response to previous treatment: Patient with no complaints from previous session  Family / Caregiver Present: No  Pre Treatment Pain Screening  Pain at present: 0  Scale Used: Numeric Score  Intervention List: Patient able to continue with treatment   Orientation     Objective    ADL  Grooming: Independent;Setup  UE Dressing: Setup  LE Dressing: Stand by assistance;Contact guard assistance (with use of AE.)  Toileting: Stand by assistance        Toilet Transfers  Toilet - Technique: Ambulating  Equipment Used: Grab bars  Toilet Transfer: Contact guard assistance  Bed mobility  Supine to Sit: Stand by assistance  Transfers  Stand Step Transfers: Contact guard assistance  Sit to stand: Stand by assistance;Contact guard assistance  Stand to sit: Stand by assistance                                                           Plan   Occupational Therapy Plan  Times Per Week: 3-5  Times Per Day: Once a day  Current Treatment Recommendations: Strengthening, Balance training, Functional mobility training, Endurance training, Pain management, Safety education & training, Equipment evaluation, education, & procurement, Patient/Caregiver education & training, Self-Care / ADL, Home management training  Goals  Short Term Goals  Time Frame for Short Term Goals: 2 weeks  Short Term Goal 1: Toilet with mod I  Short Term Goal 2: Toilet transfer with mod I  Short Term Goal 3: LB bathing/dressing with mod I, AE  Short Term Goal 4: Functional standing activities x 5 mins, SBA, maintaining WB precautions  Short Term Goal 5: Pt will demo hip and WB precautions during ADL and functional mobility without cues.        Therapy Time   Individual Concurrent Group Co-treatment   Time In           Time Out           Minutes              DILAN Sewell  Electronically signed by DILAN Sewell on 3/14/2023 at 3:47 PM

## 2023-03-14 NOTE — PROGRESS NOTES
Physical Therapy  Name: Sidney Harrington  MRN:  [id number]  Date of service:  3/14/2023     03/14/23 1416   Restrictions/Precautions   Restrictions/Precautions Fall Risk;Weight Bearing   Lower Extremity Weight Bearing Restrictions   Right Lower Extremity Weight Bearing Toe Touch Weight Bearing   Position Activity Restriction   Hip Precautions Posterior hip precautions   General   Chart Reviewed Yes   Subjective   Subjective Pt up in room, states he is getting ready to leave. Pain Assessment   Pain Assessment None - Denies Pain   Transfers   Stand to Sit Stand by assistance   Ambulation   WB Status TTWB   Ambulation   Surface Level tile   Device Rolling Walker   Assistance Stand by assistance   Distance 5'   Other Activities   Comment pt education provided regarding posterior hip precautions during dressing tasks   Short Term Goals   Time Frame for Short Term Goals 14 days   Short Term Goal 1 amb 48' with RW while maintaining WB status, SBA   Short Term Goal 2 sit <> stand, SBA w/o verbal cues for WB status   Short Term Goal 3 bed mobility, SBA   Conditions Requiring Skilled Therapeutic Intervention   Body Structures, Functions, Activity Limitations Requiring Skilled Therapeutic Intervention Decreased functional mobility ; Decreased ADL status; Decreased ROM; Decreased strength;Decreased endurance;Decreased safe awareness   Assessment Tx focused on short amb distance back to chair and emphasis on pt education for posterior hip precautions as pt had gotten dressed with no assistance.    Activity Tolerance   Activity Tolerance Patient tolerated treatment well   PT Plan of Care   Tuesday X   Safety Devices   Type of Devices Call light within reach;Gait belt;Left in chair  (left with DILAN for further dressing instructions with equipment)         Electronically signed by Donovan Centeno PTA on 3/14/2023 at 2:34 PM

## 2023-03-14 NOTE — CARE COORDINATION
REID spoke with the [facility] regarding status of short term rehab placement. The referral has not been reviewed yet and the nurse who usually does them, is out. The CC worker stated she was going to see who is covering and have it expedited. SW stated with urgency, that the Pt in question is medically cleared to DC. CC worker stated she would get back with this SW and hopefully get it expedited. [facility]    option 4    Electronically signed by Della Gil on 3/14/2023 at 8:46 AM      SW received a call back stating they never received the documentation from yesterday. Documentation was printed out and faxed over to the [facility] on Monday March 13th. Case Management received successful confirmation that the fax was received. Case Management will again send over the requested documentation to the [place].      Electronically signed by Della Gil on 3/14/2023 at 11:59 AM

## 2023-03-15 ENCOUNTER — TELEPHONE (OUTPATIENT)
Dept: INPATIENT UNIT | Age: 78
End: 2023-03-15

## 2023-03-21 LAB
FUNGUS SPEC CULT: NORMAL
KOH PREP SPEC: NORMAL

## 2023-03-22 LAB
ACID FAST STN SPEC QL: NORMAL
MYCOBACTERIUM SPEC CULT: NORMAL

## 2023-03-28 LAB
FUNGUS SPEC CULT: NORMAL
KOH PREP SPEC: NORMAL

## 2023-03-29 LAB
ACID FAST STN SPEC QL: NORMAL
MYCOBACTERIUM SPEC CULT: NORMAL

## 2023-04-04 LAB
FUNGUS SPEC CULT: NORMAL
KOH PREP SPEC: NORMAL

## 2023-04-05 LAB
ACID FAST STN SPEC QL: NORMAL
MYCOBACTERIUM SPEC CULT: NORMAL

## 2023-04-19 LAB
ACID FAST STN SPEC QL: NORMAL
MYCOBACTERIUM SPEC CULT: NORMAL

## 2023-04-26 LAB
ACID FAST STN SPEC QL: NORMAL
MYCOBACTERIUM SPEC CULT: NORMAL

## 2024-06-18 PROBLEM — Z12.11 ENCOUNTER FOR SCREENING FOR MALIGNANT NEOPLASM OF COLON: Status: RESOLVED | Noted: 2019-08-23 | Resolved: 2024-06-18

## 2024-06-18 PROBLEM — Z86.010 HISTORY OF COLON POLYPS: Status: ACTIVE | Noted: 2024-06-18

## 2024-06-19 ENCOUNTER — OFFICE VISIT (OUTPATIENT)
Dept: GASTROENTEROLOGY | Facility: CLINIC | Age: 79
End: 2024-06-19
Payer: OTHER GOVERNMENT

## 2024-06-19 VITALS
TEMPERATURE: 98 F | HEART RATE: 97 BPM | WEIGHT: 178 LBS | HEIGHT: 66 IN | BODY MASS INDEX: 28.61 KG/M2 | OXYGEN SATURATION: 97 % | SYSTOLIC BLOOD PRESSURE: 128 MMHG | DIASTOLIC BLOOD PRESSURE: 80 MMHG

## 2024-06-19 DIAGNOSIS — Z80.0 FAMILY HISTORY OF COLON CANCER: ICD-10-CM

## 2024-06-19 DIAGNOSIS — Z86.010 HISTORY OF COLON POLYPS: Primary | ICD-10-CM

## 2024-06-19 RX ORDER — POLYETHYLENE GLYCOL 3350 17 G/17G
238 POWDER, FOR SOLUTION ORAL ONCE
Qty: 238 G | Refills: 0 | Status: CANCELLED | OUTPATIENT
Start: 2024-06-19 | End: 2024-06-19

## 2024-06-19 RX ORDER — VITAMIN B COMPLEX
CAPSULE ORAL DAILY
COMMUNITY

## 2024-06-19 RX ORDER — GABAPENTIN 300 MG/1
300 CAPSULE ORAL 3 TIMES DAILY
COMMUNITY

## 2024-07-05 ENCOUNTER — ANESTHESIA (OUTPATIENT)
Dept: GASTROENTEROLOGY | Facility: HOSPITAL | Age: 79
End: 2024-07-05
Payer: OTHER GOVERNMENT

## 2024-07-05 ENCOUNTER — TELEPHONE (OUTPATIENT)
Dept: GASTROENTEROLOGY | Facility: CLINIC | Age: 79
End: 2024-07-05
Payer: OTHER GOVERNMENT

## 2024-07-05 ENCOUNTER — ANESTHESIA EVENT (OUTPATIENT)
Dept: GASTROENTEROLOGY | Facility: HOSPITAL | Age: 79
End: 2024-07-05
Payer: OTHER GOVERNMENT

## 2024-07-05 ENCOUNTER — HOSPITAL ENCOUNTER (OUTPATIENT)
Facility: HOSPITAL | Age: 79
Setting detail: HOSPITAL OUTPATIENT SURGERY
Discharge: HOME | End: 2024-07-05
Attending: INTERNAL MEDICINE | Admitting: INTERNAL MEDICINE
Payer: OTHER GOVERNMENT

## 2024-07-05 VITALS
HEART RATE: 52 BPM | HEIGHT: 66 IN | DIASTOLIC BLOOD PRESSURE: 59 MMHG | OXYGEN SATURATION: 96 % | WEIGHT: 168 LBS | SYSTOLIC BLOOD PRESSURE: 101 MMHG | RESPIRATION RATE: 14 BRPM | BODY MASS INDEX: 27 KG/M2 | TEMPERATURE: 97.1 F

## 2024-07-05 PROCEDURE — 45385 COLONOSCOPY W/LESION REMOVAL: CPT | Performed by: INTERNAL MEDICINE

## 2024-07-05 PROCEDURE — 25810000003 SODIUM CHLORIDE 0.9 % SOLUTION: Performed by: ANESTHESIOLOGY

## 2024-07-05 PROCEDURE — 25010000002 PROPOFOL 10 MG/ML EMULSION: Performed by: NURSE ANESTHETIST, CERTIFIED REGISTERED

## 2024-07-05 RX ORDER — SODIUM CHLORIDE 9 MG/ML
500 INJECTION, SOLUTION INTRAVENOUS CONTINUOUS PRN
Status: DISCONTINUED | OUTPATIENT
Start: 2024-07-05 | End: 2024-07-05 | Stop reason: HOSPADM

## 2024-07-05 RX ORDER — LIDOCAINE HYDROCHLORIDE 10 MG/ML
0.5 INJECTION, SOLUTION EPIDURAL; INFILTRATION; INTRACAUDAL; PERINEURAL ONCE AS NEEDED
Status: DISCONTINUED | OUTPATIENT
Start: 2024-07-05 | End: 2024-07-05 | Stop reason: HOSPADM

## 2024-07-05 RX ORDER — SODIUM CHLORIDE 9 MG/ML
40 INJECTION, SOLUTION INTRAVENOUS AS NEEDED
Status: CANCELLED | OUTPATIENT
Start: 2024-07-05

## 2024-07-05 RX ORDER — SODIUM CHLORIDE 0.9 % (FLUSH) 0.9 %
10 SYRINGE (ML) INJECTION AS NEEDED
Status: CANCELLED | OUTPATIENT
Start: 2024-07-05

## 2024-07-05 RX ORDER — SODIUM CHLORIDE 0.9 % (FLUSH) 0.9 %
10 SYRINGE (ML) INJECTION EVERY 12 HOURS SCHEDULED
Status: CANCELLED | OUTPATIENT
Start: 2024-07-05

## 2024-07-05 RX ORDER — MELOXICAM 7.5 MG/1
7.5 TABLET ORAL DAILY
COMMUNITY

## 2024-07-05 RX ORDER — SODIUM CHLORIDE 0.9 % (FLUSH) 0.9 %
10 SYRINGE (ML) INJECTION AS NEEDED
Status: DISCONTINUED | OUTPATIENT
Start: 2024-07-05 | End: 2024-07-05 | Stop reason: HOSPADM

## 2024-07-05 RX ORDER — LIDOCAINE HYDROCHLORIDE 20 MG/ML
INJECTION, SOLUTION EPIDURAL; INFILTRATION; INTRACAUDAL; PERINEURAL AS NEEDED
Status: DISCONTINUED | OUTPATIENT
Start: 2024-07-05 | End: 2024-07-05 | Stop reason: SURG

## 2024-07-05 RX ORDER — SODIUM CHLORIDE 9 MG/ML
100 INJECTION, SOLUTION INTRAVENOUS CONTINUOUS
Status: CANCELLED | OUTPATIENT
Start: 2024-07-05

## 2024-07-05 RX ORDER — PROPOFOL 10 MG/ML
VIAL (ML) INTRAVENOUS AS NEEDED
Status: DISCONTINUED | OUTPATIENT
Start: 2024-07-05 | End: 2024-07-05 | Stop reason: SURG

## 2024-07-05 RX ADMIN — PROPOFOL 250 MG: 10 INJECTION, EMULSION INTRAVENOUS at 10:08

## 2024-07-05 RX ADMIN — LIDOCAINE HYDROCHLORIDE 50 MG: 20 INJECTION, SOLUTION EPIDURAL; INFILTRATION; INTRACAUDAL; PERINEURAL at 10:09

## 2024-07-05 RX ADMIN — SODIUM CHLORIDE 500 ML: 0.9 INJECTION, SOLUTION INTRAVENOUS at 09:42

## (undated) DEVICE — ENDOGATOR AUXILIARY WATER JET CONNECTOR: Brand: ENDOGATOR

## (undated) DEVICE — KIT BNE CEM PREP FEM QUIK-USE 1 PER CA

## (undated) DEVICE — THE CHANNEL CLEANING BRUSH IS A NYLON FLEXI BRUSH ATTACHED TO A FLEXIBLE PLASTIC SHEATH DESIGNED TO SAFELY REMOVE DEBRIS FROM FLEXIBLE ENDOSCOPES.

## (undated) DEVICE — LARYNGOSCOPE BLDE MAC HNDL M SZ 35 ST CURAPLEX CURAVIEW LED

## (undated) DEVICE — TUBE ET 7.5MM NSL ORAL BASIC CUF INTMED MURPHY EYE RADPQ

## (undated) DEVICE — SENSR O2 OXIMAX FNGR A/ 18IN NONSTR

## (undated) DEVICE — PAD,ARMBOARD,CONV,FOAM,2X8X20",12PR/CS: Brand: MEDLINE

## (undated) DEVICE — COAXIAL FEMORAL CANAL TIP

## (undated) DEVICE — PILLOW POS W15XH6XL22IN RASPBERRY FOAM ABD W/ STRP DISP FOR

## (undated) DEVICE — SNAR POLYP CAPTIVATOR MICROHEX 13 240CM

## (undated) DEVICE — YANKAUER,BULB TIP WITH VENT: Brand: ARGYLE

## (undated) DEVICE — TOTAL HIP

## (undated) DEVICE — Device: Brand: DEFENDO AIR/WATER/SUCTION AND BIOPSY VALVE

## (undated) DEVICE — GLOVE SURG SZ 85 L12IN FNGR ORTHO 126MIL CRM LTX FREE

## (undated) DEVICE — CONTAINER,SPECIMEN,OR STERILE,4OZ: Brand: MEDLINE

## (undated) DEVICE — LIGHT SUCT UNTETHERED SCINTILLANT

## (undated) DEVICE — GLOVE SURG SZ 85 L12IN FNGR THK79MIL GRN LTX FREE

## (undated) DEVICE — MASK,OXYGEN,MED CONC,ADLT,7' TUB, UC: Brand: PENDING

## (undated) DEVICE — SOLUTION IRRIG 3000ML 0.9% SOD CHL USP UROMATIC PLAS CONT

## (undated) DEVICE — GOWN,PREVENTION PLUS,XL,ST,24/CS: Brand: MEDLINE

## (undated) DEVICE — TBG SMPL FLTR LINE NASL 02/C02 A/ BX/100

## (undated) DEVICE — DUAL CUT SAGITTAL BLADE

## (undated) DEVICE — 3M™ STERI-DRAPE™ U-DRAPE 1015: Brand: STERI-DRAPE™

## (undated) DEVICE — CONMED SCOPE SAVER BITE BLOCK, 20X27 MM: Brand: SCOPE SAVER

## (undated) DEVICE — FRCP BX RADJAW4 NDL 2.8 240 STD OG

## (undated) DEVICE — SUTURE VCRL SZ 2-0 L36IN ABSRB UD L36MM CT-1 1/2 CIR J945H

## (undated) DEVICE — SUTURE MCRYL SZ 3 0 L18IN ABSRB UD PS 2 3 8 CIR REV CUT NDL MCP497G

## (undated) DEVICE — DRAPE,TOWEL,LARGE,INVISISHIELD: Brand: MEDLINE

## (undated) DEVICE — Device

## (undated) DEVICE — CUFF,BP,DISP,1 TUBE,ADULT,HP: Brand: MEDLINE

## (undated) DEVICE — HUMERAL NOZZLE: Brand: REVOLUTION

## (undated) DEVICE — GLOVE SURG SZ 85 CRM LTX FREE POLYISOPRENE POLYMER BEAD ANTI

## (undated) DEVICE — 6.3MM ROUND FAST CUTTING BUR

## (undated) DEVICE — DRAPE,ORTHOMAX ,HIP,W/POUCHES: Brand: MEDLINE

## (undated) DEVICE — SUTURE VCRL SZ 0 L27IN ABSRB UD L36MM CT-1 1/2 CIR J260H

## (undated) DEVICE — THE SINGLE USE ETRAP – POLYP TRAP IS USED FOR SUCTION RETRIEVAL OF ENDOSCOPICALLY REMOVED POLYPS.: Brand: ETRAP

## (undated) DEVICE — TOTAL TRAY, 16FR 10ML SIL FOLEY, URN: Brand: MEDLINE

## (undated) DEVICE — DRESSING BORDERED ADH GZ UNIV GEN USE 8INX4IN AND 6INX2IN

## (undated) DEVICE — CHLORAPREP 26ML ORANGE

## (undated) DEVICE — PATIENT RETURN ELECTRODE, SINGLE-USE, CONTACT QUALITY MONITORING, ADULT, WITH 9FT CORD, FOR PATIENTS WEIGING OVER 33LBS. (15KG): Brand: MEGADYNE

## (undated) DEVICE — SUTURE MAXBRAID SZ 2 NONABSORBABLE BLU C-7 TAPR NDL 900335